# Patient Record
Sex: FEMALE | Race: WHITE | NOT HISPANIC OR LATINO | Employment: FULL TIME | URBAN - METROPOLITAN AREA
[De-identification: names, ages, dates, MRNs, and addresses within clinical notes are randomized per-mention and may not be internally consistent; named-entity substitution may affect disease eponyms.]

---

## 2017-04-25 ENCOUNTER — TRANSFERRED RECORDS (OUTPATIENT)
Dept: HEALTH INFORMATION MANAGEMENT | Facility: CLINIC | Age: 37
End: 2017-04-25

## 2017-04-26 ENCOUNTER — HOSPITAL ENCOUNTER (OUTPATIENT)
Facility: CLINIC | Age: 37
Setting detail: OBSERVATION
Discharge: HOME | End: 2017-04-28
Attending: EMERGENCY MEDICINE | Admitting: HOSPITALIST
Payer: COMMERCIAL

## 2017-04-26 ENCOUNTER — APPOINTMENT (OUTPATIENT)
Dept: CT IMAGING | Facility: CLINIC | Age: 37
End: 2017-04-26
Attending: EMERGENCY MEDICINE
Payer: COMMERCIAL

## 2017-04-26 DIAGNOSIS — Z98.890 STATUS POST COLONOSCOPY: ICD-10-CM

## 2017-04-26 DIAGNOSIS — K65.9 PERITONITIS (H): Primary | ICD-10-CM

## 2017-04-26 LAB
ALBUMIN SERPL-MCNC: 4.2 G/DL (ref 3.4–5)
ALBUMIN UR-MCNC: NEGATIVE MG/DL
ALP SERPL-CCNC: 61 U/L (ref 40–150)
ALT SERPL W P-5'-P-CCNC: 27 U/L (ref 0–50)
ANION GAP SERPL CALCULATED.3IONS-SCNC: 5 MMOL/L (ref 3–14)
APPEARANCE UR: CLEAR
AST SERPL W P-5'-P-CCNC: 18 U/L (ref 0–45)
BASOPHILS # BLD AUTO: 0 10E9/L (ref 0–0.2)
BASOPHILS NFR BLD AUTO: 0.2 %
BILIRUB SERPL-MCNC: 0.6 MG/DL (ref 0.2–1.3)
BILIRUB UR QL STRIP: NEGATIVE
BUN SERPL-MCNC: 7 MG/DL (ref 7–30)
CALCIUM SERPL-MCNC: 8.9 MG/DL (ref 8.5–10.1)
CHLORIDE SERPL-SCNC: 102 MMOL/L (ref 94–109)
CO2 SERPL-SCNC: 30 MMOL/L (ref 20–32)
COLOR UR AUTO: YELLOW
CREAT SERPL-MCNC: 0.71 MG/DL (ref 0.52–1.04)
DIFFERENTIAL METHOD BLD: ABNORMAL
EOSINOPHIL # BLD AUTO: 0.1 10E9/L (ref 0–0.7)
EOSINOPHIL NFR BLD AUTO: 0.7 %
ERYTHROCYTE [DISTWIDTH] IN BLOOD BY AUTOMATED COUNT: 12.7 % (ref 10–15)
GFR SERPL CREATININE-BSD FRML MDRD: ABNORMAL ML/MIN/1.7M2
GLUCOSE SERPL-MCNC: 111 MG/DL (ref 70–99)
GLUCOSE UR STRIP-MCNC: NEGATIVE MG/DL
HCG SERPL QL: NEGATIVE
HCT VFR BLD AUTO: 41.9 % (ref 35–47)
HGB BLD-MCNC: 14 G/DL (ref 11.7–15.7)
HGB UR QL STRIP: NEGATIVE
IMM GRANULOCYTES # BLD: 0 10E9/L (ref 0–0.4)
IMM GRANULOCYTES NFR BLD: 0.3 %
KETONES UR STRIP-MCNC: 10 MG/DL
LEUKOCYTE ESTERASE UR QL STRIP: NEGATIVE
LIPASE SERPL-CCNC: 127 U/L (ref 73–393)
LYMPHOCYTES # BLD AUTO: 1.3 10E9/L (ref 0.8–5.3)
LYMPHOCYTES NFR BLD AUTO: 10 %
MCH RBC QN AUTO: 32.4 PG (ref 26.5–33)
MCHC RBC AUTO-ENTMCNC: 33.4 G/DL (ref 31.5–36.5)
MCV RBC AUTO: 97 FL (ref 78–100)
MONOCYTES # BLD AUTO: 0.8 10E9/L (ref 0–1.3)
MONOCYTES NFR BLD AUTO: 5.8 %
NEUTROPHILS # BLD AUTO: 11.1 10E9/L (ref 1.6–8.3)
NEUTROPHILS NFR BLD AUTO: 83 %
NITRATE UR QL: NEGATIVE
NRBC # BLD AUTO: 0 10*3/UL
NRBC BLD AUTO-RTO: 0 /100
PH UR STRIP: 7.5 PH (ref 5–7)
PLATELET # BLD AUTO: 200 10E9/L (ref 150–450)
POTASSIUM SERPL-SCNC: 4.6 MMOL/L (ref 3.4–5.3)
PROT SERPL-MCNC: 7.6 G/DL (ref 6.8–8.8)
RBC # BLD AUTO: 4.32 10E12/L (ref 3.8–5.2)
RBC #/AREA URNS AUTO: <1 /HPF (ref 0–2)
SODIUM SERPL-SCNC: 137 MMOL/L (ref 133–144)
SP GR UR STRIP: 1.01 (ref 1–1.03)
SQUAMOUS #/AREA URNS AUTO: 1 /HPF (ref 0–1)
URN SPEC COLLECT METH UR: ABNORMAL
UROBILINOGEN UR STRIP-MCNC: NORMAL MG/DL (ref 0–2)
WBC # BLD AUTO: 13.3 10E9/L (ref 4–11)
WBC #/AREA URNS AUTO: 1 /HPF (ref 0–2)

## 2017-04-26 PROCEDURE — 99254 IP/OBS CNSLTJ NEW/EST MOD 60: CPT | Performed by: SURGERY

## 2017-04-26 PROCEDURE — 25000125 ZZHC RX 250: Performed by: EMERGENCY MEDICINE

## 2017-04-26 PROCEDURE — 25000128 H RX IP 250 OP 636: Performed by: PHYSICIAN ASSISTANT

## 2017-04-26 PROCEDURE — 80053 COMPREHEN METABOLIC PANEL: CPT | Performed by: EMERGENCY MEDICINE

## 2017-04-26 PROCEDURE — 96361 HYDRATE IV INFUSION ADD-ON: CPT

## 2017-04-26 PROCEDURE — G0378 HOSPITAL OBSERVATION PER HR: HCPCS

## 2017-04-26 PROCEDURE — 81001 URINALYSIS AUTO W/SCOPE: CPT | Performed by: PHYSICIAN ASSISTANT

## 2017-04-26 PROCEDURE — 83690 ASSAY OF LIPASE: CPT | Performed by: EMERGENCY MEDICINE

## 2017-04-26 PROCEDURE — 74177 CT ABD & PELVIS W/CONTRAST: CPT

## 2017-04-26 PROCEDURE — 99220 ZZC INITIAL OBSERVATION CARE,LEVL III: CPT | Performed by: PHYSICIAN ASSISTANT

## 2017-04-26 PROCEDURE — 25000128 H RX IP 250 OP 636: Performed by: EMERGENCY MEDICINE

## 2017-04-26 PROCEDURE — 25500064 ZZH RX 255 OP 636: Performed by: EMERGENCY MEDICINE

## 2017-04-26 PROCEDURE — 99285 EMERGENCY DEPT VISIT HI MDM: CPT | Mod: 25

## 2017-04-26 PROCEDURE — 84703 CHORIONIC GONADOTROPIN ASSAY: CPT | Performed by: EMERGENCY MEDICINE

## 2017-04-26 PROCEDURE — 96374 THER/PROPH/DIAG INJ IV PUSH: CPT | Mod: 59

## 2017-04-26 PROCEDURE — 85025 COMPLETE CBC W/AUTO DIFF WBC: CPT | Performed by: EMERGENCY MEDICINE

## 2017-04-26 RX ORDER — PROCHLORPERAZINE 25 MG
25 SUPPOSITORY, RECTAL RECTAL EVERY 12 HOURS PRN
Status: DISCONTINUED | OUTPATIENT
Start: 2017-04-26 | End: 2017-04-28 | Stop reason: HOSPADM

## 2017-04-26 RX ORDER — VILAZODONE HYDROCHLORIDE 20 MG/1
20 TABLET ORAL DAILY
Status: DISCONTINUED | OUTPATIENT
Start: 2017-04-27 | End: 2017-04-28 | Stop reason: HOSPADM

## 2017-04-26 RX ORDER — ACETAMINOPHEN 325 MG/1
650 TABLET ORAL EVERY 4 HOURS PRN
Status: DISCONTINUED | OUTPATIENT
Start: 2017-04-26 | End: 2017-04-28 | Stop reason: HOSPADM

## 2017-04-26 RX ORDER — PROCHLORPERAZINE MALEATE 5 MG
5-10 TABLET ORAL EVERY 6 HOURS PRN
Status: DISCONTINUED | OUTPATIENT
Start: 2017-04-26 | End: 2017-04-28 | Stop reason: HOSPADM

## 2017-04-26 RX ORDER — LIDOCAINE 40 MG/G
CREAM TOPICAL
Status: DISCONTINUED | OUTPATIENT
Start: 2017-04-26 | End: 2017-04-28 | Stop reason: HOSPADM

## 2017-04-26 RX ORDER — ACETAMINOPHEN 650 MG/1
650 SUPPOSITORY RECTAL EVERY 4 HOURS PRN
Status: DISCONTINUED | OUTPATIENT
Start: 2017-04-26 | End: 2017-04-28 | Stop reason: HOSPADM

## 2017-04-26 RX ORDER — PIPERACILLIN SODIUM, TAZOBACTAM SODIUM 3; .375 G/15ML; G/15ML
3.38 INJECTION, POWDER, LYOPHILIZED, FOR SOLUTION INTRAVENOUS EVERY 6 HOURS
Status: DISCONTINUED | OUTPATIENT
Start: 2017-04-26 | End: 2017-04-28 | Stop reason: HOSPADM

## 2017-04-26 RX ORDER — LAMOTRIGINE 25 MG/1
25 TABLET ORAL DAILY
Status: DISCONTINUED | OUTPATIENT
Start: 2017-04-27 | End: 2017-04-28 | Stop reason: HOSPADM

## 2017-04-26 RX ORDER — PIPERACILLIN SODIUM, TAZOBACTAM SODIUM 3; .375 G/15ML; G/15ML
3.38 INJECTION, POWDER, LYOPHILIZED, FOR SOLUTION INTRAVENOUS ONCE
Status: COMPLETED | OUTPATIENT
Start: 2017-04-26 | End: 2017-04-26

## 2017-04-26 RX ORDER — PRENATAL VIT/IRON FUM/FOLIC AC 27MG-0.8MG
1 TABLET ORAL DAILY
COMMUNITY

## 2017-04-26 RX ORDER — HYDROMORPHONE HYDROCHLORIDE 1 MG/ML
.3-.5 INJECTION, SOLUTION INTRAMUSCULAR; INTRAVENOUS; SUBCUTANEOUS
Status: DISCONTINUED | OUTPATIENT
Start: 2017-04-26 | End: 2017-04-28 | Stop reason: HOSPADM

## 2017-04-26 RX ORDER — OXYCODONE HYDROCHLORIDE 5 MG/1
5-10 TABLET ORAL
Status: DISCONTINUED | OUTPATIENT
Start: 2017-04-26 | End: 2017-04-28 | Stop reason: HOSPADM

## 2017-04-26 RX ORDER — AMOXICILLIN 250 MG
1-2 CAPSULE ORAL 2 TIMES DAILY PRN
Status: DISCONTINUED | OUTPATIENT
Start: 2017-04-26 | End: 2017-04-28 | Stop reason: HOSPADM

## 2017-04-26 RX ORDER — ONDANSETRON 4 MG/1
4 TABLET, ORALLY DISINTEGRATING ORAL EVERY 6 HOURS PRN
Status: DISCONTINUED | OUTPATIENT
Start: 2017-04-26 | End: 2017-04-28 | Stop reason: HOSPADM

## 2017-04-26 RX ORDER — ONDANSETRON 2 MG/ML
4 INJECTION INTRAMUSCULAR; INTRAVENOUS
Status: DISCONTINUED | OUTPATIENT
Start: 2017-04-26 | End: 2017-04-26

## 2017-04-26 RX ORDER — NALOXONE HYDROCHLORIDE 0.4 MG/ML
.1-.4 INJECTION, SOLUTION INTRAMUSCULAR; INTRAVENOUS; SUBCUTANEOUS
Status: DISCONTINUED | OUTPATIENT
Start: 2017-04-26 | End: 2017-04-28 | Stop reason: HOSPADM

## 2017-04-26 RX ORDER — SODIUM CHLORIDE 9 MG/ML
INJECTION, SOLUTION INTRAVENOUS CONTINUOUS
Status: DISCONTINUED | OUTPATIENT
Start: 2017-04-26 | End: 2017-04-28 | Stop reason: HOSPADM

## 2017-04-26 RX ORDER — IOPAMIDOL 755 MG/ML
53 INJECTION, SOLUTION INTRAVASCULAR ONCE
Status: COMPLETED | OUTPATIENT
Start: 2017-04-26 | End: 2017-04-26

## 2017-04-26 RX ORDER — ONDANSETRON 2 MG/ML
4 INJECTION INTRAMUSCULAR; INTRAVENOUS EVERY 6 HOURS PRN
Status: DISCONTINUED | OUTPATIENT
Start: 2017-04-26 | End: 2017-04-28 | Stop reason: HOSPADM

## 2017-04-26 RX ADMIN — SODIUM CHLORIDE 1000 ML: 9 INJECTION, SOLUTION INTRAVENOUS at 11:26

## 2017-04-26 RX ADMIN — IOPAMIDOL 53 ML: 755 INJECTION, SOLUTION INTRAVENOUS at 10:35

## 2017-04-26 RX ADMIN — PIPERACILLIN SODIUM,TAZOBACTAM SODIUM 3.38 G: 3; .375 INJECTION, POWDER, FOR SOLUTION INTRAVENOUS at 20:11

## 2017-04-26 RX ADMIN — SODIUM CHLORIDE 125 ML/HR: 9 INJECTION, SOLUTION INTRAVENOUS at 14:13

## 2017-04-26 RX ADMIN — SODIUM CHLORIDE 60 ML: 9 INJECTION, SOLUTION INTRAVENOUS at 10:35

## 2017-04-26 RX ADMIN — PIPERACILLIN SODIUM,TAZOBACTAM SODIUM 3.38 G: 3; .375 INJECTION, POWDER, FOR SOLUTION INTRAVENOUS at 13:25

## 2017-04-26 ASSESSMENT — ENCOUNTER SYMPTOMS
BACK PAIN: 0
DIFFICULTY URINATING: 0
ABDOMINAL PAIN: 1
FLANK PAIN: 0
DYSURIA: 0
VOMITING: 0
FREQUENCY: 0
FEVER: 0
NAUSEA: 1
HEMATURIA: 0
CHILLS: 0
DIARRHEA: 0

## 2017-04-26 NOTE — CONSULTS
General Surgery Consultation    Stephanie Corbin MRN# [id number]   Age: 36 year old YOB: 1980     Date of Admission:  4/26/2017    Reason for consult: Colitis following colonoscopy with polypectomy       Requesting physician: Jl                Assessment and Plan:   Assessment:   Acute colitis of the cecum following polypectomy      Plan:   N.p.o.  IV antibiotics  Will follow along             Chief Complaint:   Abdominal pain     History is obtained from the patient         History of Present Illness:   This patient is a 36 year old  female without a significant past medical history who presents with Abdominal pain.  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 A sessile polyp was removed from the cecum. Last night she abruptly developed lower abdominal pain in the lower abdomen, this has seemed less today but persistent. She presented to the ed where a CT was obtained, she has no free air but there is noted thickening of the cecum and some free fluid. She was admitted and administered IV abx. She denies nausea or vomiting, she has had no fevers or chills.          Past Medical History:    has a past medical history of Anxiety.          Past Surgical History:     Past Surgical History:   Procedure Laterality Date     ENT SURGERY             Medications:     No current facility-administered medications on file prior to encounter.   No current outpatient prescriptions on file prior to encounter.      Allergies:      Allergies   Allergen Reactions     Reglan [Metoclopramide]             Social History:             Family History:   The patient has no family history of any bleeding, clotting or anesthesia problems.          Review of Systems:   Ten point Review of Systems is negative other than noted in the HPI          Physical Exam:   Gen:  This is a well developed, well nourished woman in no apparent distress.  Blood pressure 104/44,  "pulse 70, temperature 99  F (37.2  C), temperature source Oral, resp. rate 16, height 1.702 m (5' 7.01\"), weight 46.2 kg (101 lb 12.8 oz), SpO2 95 %.  HEENT - Normocephalic, atraumatic, mucous membranes moist.  no scleral icterus.  Neck - supple without masses  Lungs - clear to ascultation.    Heart - Regular rate & rhythm without murmur  Abdomen:   soft, non-distended, tenderness noted in the right lower quadrant and in the left lower quadrant and no masses palpated, hypoactive bowel sounds, mild tenderness to percussion   Extremities - warm without edema  Neurologic - nonfocal          Data:   WBC -   WBC   Date Value Ref Range Status   04/26/2017 13.3 (H) 4.0 - 11.0 10e9/L Final   ], HgB - Hemoglobin   Date Value Ref Range Status   04/26/2017 14.0 11.7 - 15.7 g/dL Final   ]   Liver Function Studies -   Recent Labs   Lab Test  04/26/17   0919   PROTTOTAL  7.6   ALBUMIN  4.2   BILITOTAL  0.6   ALKPHOS  61   AST  18   ALT  27     CT scan of the abdomen:   Personally reviewed   Ultrasound of the abdomen:     William Dumont MD       "

## 2017-04-26 NOTE — PLAN OF CARE
Problem: Goal Outcome Summary  Goal: Goal Outcome Summary  Outcome: No Change  RN:  Patient A/O x4.  Heart rate parveen, other vital signs stable.  No requests for pain medication but states she does have abdominal pain.  IV fluids and IV abx infusing as ordered.  Tolerating diet at this time.  No emesis.  GI and surgery following.  Discharge pending progress.

## 2017-04-26 NOTE — CONSULTS
"[facility]  Gastroenterology Consultation    Stephanie Corbin  [id number] [facility] [address]  36 year old female    Admission Date/Time: 4/26/2017  Primary Care Provider: [facility]    We were asked to see the patient in consultation by HARDEEP France for evaluation of abdominal pain.        HPI:  Stephanie Corbin is a 36 year old female who has a PMH significant for santana syndrome and anxiety who presents to [facility] for evaluation of abdominal pain.    Per the patient, she was in her usual state of health and had a colonoscopy yesterday.  She had a flat lesion removed from the cecum with a hot snare.  About 2-3 hours after this, she developed lower quadrant abdominal pain.  This persisted and was very sharp.  She denies any fever, chills, nausea, vomiting, CP, SOB, diarrhea, hematochezia or melena.      On admission, WBC was mildly increased at 13.3.  Labs were otherwise unremarkable.   CT scan revealed inflammatory wall thickening of the cecum and distal ileum distended with fluid.  The appendix could not be visualized.  No perforation was noted.      ROS: A comprehensive ten point review of systems was negative aside from those in mentioned in the HPI.      MEDICATIONS:   No current facility-administered medications on file prior to encounter.   No current outpatient prescriptions on file prior to encounter.    ALLERGIES:   Allergies   Allergen Reactions     Reglan [Metoclopramide]        Past Medical History:   Diagnosis Date     Anxiety        Past Surgical History:   Procedure Laterality Date     ENT SURGERY           SOCIAL HISTORY:  Social History   Substance Use Topics     Smoking status: Never Smoker     Smokeless tobacco: Not on file     Alcohol use Not on file       FAMILY HISTORY:  Santana syndrome    PHYSICAL EXAM:   /44 (BP Location: Left arm)  Pulse 70  Temp 99  F (37.2  C) (Oral)  Resp 16  Ht 1.702 m (5' 7.01\")  Wt 46.2 kg (101 lb " 12.8 oz)  SpO2 95%  BMI 15.94 kg/m2    Constitutional: NAD, comfortable  Cardiovascular: RRR, normal S1 and S2, no r/c/g/m  Respiratory: CTAB  Psychiatric: mentation appears normal and affect normal  Head: Normocephalic. Atraumatic.    Neck: Neck supple. No adenopathy. Thyroid symmetric, normal size, trachea midline  Eyes:  PERRL, no icterus  ENT: Hearing adequate, pharynx normal without erythema or exudate  Abdomen:   Auscultation: +BS  Appearance: normal  Palpation: soft, TTP with guarding in the suprapelvic region but no rebound.  Non-distended  NEURO: grossly negative  SKIN: no suspicious lesions or rashes  LYMPH:   anterior cervical: no adenopathy  posterior cervical: no adenopathy  supraclavicular: no adenopathy          ADDITIONAL COMMENTS:   I reviewed the patient's new clinical lab test results.   Recent Labs   Lab Test  04/26/17   0919   WBC  13.3*   HGB  14.0   MCV  97   PLT  200     Recent Labs   Lab Test  04/26/17   0919   NA  137   POTASSIUM  4.6   CHLORIDE  102   CO2  30   BUN  7   CR  0.71   ANIONGAP  5   MOOKIE  8.9   GLC  111*     Recent Labs   Lab Test  04/26/17   0919   ALBUMIN  4.2   BILITOTAL  0.6   ALT  27   AST  18   ALKPHOS  61   LIPASE  127             .    CONSULTATION ASSESSMENT AND PLAN:    Active Problems:  Serosal burn, cecum    Assessment: 36 year old female with likely serosal burn secondary to removal of a flat lesion in the cecum on 4/25 with a hot snare.  No peritoneal signs or symptoms/signs of perforation.      Plan:   -Full liquids OK  -Hold on CRS consult for now  -Continue IVF, IV antibiotics  -Patient will likely improve and be able to be discharged tomorrow but monitor course        I will discuss the patient's findings and plan with Dr. Parikh who will independently examine the patient and add further recommendations as necessary.          HARDEEP Ledesma  [facility]  Office:  [phone] call if needed after 5PM  Cell:  [phone], not available  after 5PM at this number      GI Staff:  As above.  CT does demonstrate some fluid in the RLQ.  I would like to have surgery see but likely will improve with just observation and IV antibiotics.  Manfred Parikh MD  [phone]  After 5 pm or on weekends  [phone]

## 2017-04-26 NOTE — PROGRESS NOTES
Per surgeon, patient will be made NPO except for ice chips.  Patient with abdominal tenderness.  Will continue on IV fluids and antibiotics.

## 2017-04-26 NOTE — ED NOTES
"[facility]  ED Nurse Handoff Report    ED Chief complaint: Abdominal Pain (pt had colonoscopy at [facility] in [place] yesterday, diffuse abd pain started last night, worse today. pt denies bleeding. had polyps removed per pt.)      ED Diagnosis:   Final diagnoses:   Peritonitis (H)   Status post colonoscopy       Code Status: Full Code    Allergies:   Allergies   Allergen Reactions     Reglan [Metoclopramide]        Activity level - Baseline/Home:  Independent    Activity Level - Current:   Independent     Needed?: No    Isolation: No  Infection: Not Applicable    Bariatric?: No    Vital Signs:   Vitals:    04/26/17 0907 04/26/17 1100 04/26/17 1130 04/26/17 1200   BP: 109/68 96/54 100/59 101/55   Pulse: 70      Resp: 14 16 18 16   Temp: 98.4  F (36.9  C)      TempSrc: Oral      SpO2: 100% 100% 93% 100%   Weight: 47.6 kg (105 lb)      Height: 1.702 m (5' 7\")          Cardiac Rhythm: ,        Pain level: 0-10 Pain Scale: 3    Is this patient confused?: No    Patient Report: Initial Complaint: pt had colonoscopy yesterday had polyps removed and started with generalized abd pain last night  Focused Assessment: abd pain with nausea denies emisis  Tests Performed: labs, ct of abd  Abnormal Results: ct showed peritonitis   Treatments provided: pt npo fluids given pt refused zofran    Family Comments: none here    OBS brochure/video discussed/provided to patient: N/A    ED Medications:   Medications   ondansetron (ZOFRAN) injection 4 mg (not administered)   sodium chloride 0.9 % for CT scan flush dose 60 mL (60 mLs Intravenous Given 4/26/17 1035)   iopamidol (ISOVUE-370) solution 53 mL (53 mLs Intravenous Given 4/26/17 1035)   0.9% sodium chloride BOLUS (1,000 mLs Intravenous New Bag 4/26/17 1126)       Drips infusing?:  Yes      ED NURSE PHONE NUMBER: [phone]       "

## 2017-04-26 NOTE — H&P
PRIMARY CARE PHYSICIAN:  [facility].      DATE OF SERVICE:  04/26/2017.       CHIEF COMPLAINT:  Abdominal pain.      HISTORY OF PRESENT ILLNESS:  Stephanie Corbin is a very pleasant 36-year-old female with a past medical history of Cabrales syndrome and anxiety who presented to the Emergency Department due to abdominal pain.  The patient underwent a colonoscopy yesterday with [facility].  This was for evaluation, given her Cabrales syndrome.  The patient reports they found a polyp in the cecum and this was removed.  There were no immediate complications with the procedure.  She felt fine afterwards and returned home.  Later in the evening, she developed lower abdominal pain and bloating.  She reports that she had shooting pains throughout her lower abdomen that persisted into this morning.  Her pain is severe and is now described as more of a dull ache.  She denies any nausea or vomiting.  She has had no bowel movements since the procedure.  She states she has felt chilled overnight but no fever or sweats.  The patient called [facility] this morning and they recommended she go to the Emergency Department for evaluation.      In the Emergency Department, patient was evaluated by Dr. Villa.  There she was found to have initial vital signs of blood pressure 109/68, pulse 70 and temperature 98.4.  She had lab work that showed WBC of 13.3, but CBC was otherwise within normal limits.  Her CMP was unremarkable.  Lipase was normal.  HCG is negative.  She had a CT of the abdomen and pelvis with contrast that showed some inflammatory wall thickening of the cecum and distal ileum that appears distended with fluid.  This is suggestive for infectious or inflammatory ileitis and cecal colitis.  There was no abscess or free air.  There was some small volume of free fluid in the deep pelvis with adjacent enhancement that could represent peritonitis, per Radiology.  She was given 1 L IV  fluid bolus and started on IV Zosyn.  The case was discussed with Dr. Parikh, who is on-call for [facility].  Dr. Villa also did communicate with Dr. Dumont of General Surgery.  The plan is to admit the patient to observation care on IV fluids and antibiotics for further monitoring.      PAST MEDICAL HISTORY:   1.  Cabrales syndrome.   2.  Anxiety.      PAST SURGICAL HISTORY:   1.  ENT surgery.   2.  Colonoscopy on 04/25/2017.      FAMILY HISTORY:  Positive for Cabrales syndrome in her mother.  She otherwise denies any family history of heart attack, stroke or diabetes.      SOCIAL HISTORY:  The patient is a lifelong nonsmoker.  She does not drink alcohol.  She is an .  Her  is present with her in the Emergency Department.      PRIOR TO ADMISSION MEDICATIONS:   1.  Lamictal 25 mg daily.   2.  Prenatal multivitamin 1 tablet daily.    3.  Probiotic 1 tablet daily.   4.  Zoloft 100 mg daily.   5.  Viibryd 20 mg daily.      ALLERGIES:  Reglan, seizures.      REVIEW OF SYSTEMS:  The patient denies any fevers, headache, lightheadedness, chest pain, shortness of breath, nausea, vomiting, diarrhea, dysuria, focal weakness, numbness or tingling.  Complete 10-point review of systems was performed and is negative other than the items previously mentioned above in HPI.      PHYSICAL EXAMINATION:   VITAL SIGNS:  Blood pressure 104/44, heart rate 50 beats per minute, temperature 99, respiratory rate 16, oxygen saturation 95% on room air.   GENERAL:  The patient is alert, oriented to person, place and situation, cooperative, lying in bed in no apparent distress.   EYES:  Pupils equal and round.  Extraocular movements intact.  Sclerae are nonicteric.   HEENT:  Head normocephalic.  Throat, lips, mucosa and tongue appear moist.   NECK:  Supple.   CARDIOVASCULAR:  Heart bradycardic and regular.  No murmurs, rubs or gallops.  Distal pulses are intact.  No edema.   PULMONARY:  Lungs are clear to  auscultation bilaterally.  No crackles, wheezes or rhonchi.  Breathing is unlabored.   GASTROINTESTINAL:  Soft, diffusely tender to light palpation, greatest in bilateral lower quadrants.  There is some moderate guarding.  No rebound.  Bowel sounds are normoactive.   MUSCULOSKELETAL:  The patient moves all 4 extremities equally with normal strength.   NEUROLOGIC:  Alert.  Cranial nerves II-XII are grossly intact.  Motor function is grossly intact.  No focal deficits.   SKIN:  Cool, dry, nondiaphoretic.  No rashes seen on limited exam.   PSYCHIATRIC:  Normal mood and affect.      LABORATORY DATA:  CBC:  WBC 13.3, hemoglobin 14.0, platelet count 200, otherwise within normal limits.  CMP all within normal limits, glucose 111, creatinine 0.71, potassium 4.6.  Her hCG is negative.  Lipase 127.  LFTs are all within normal limits.      IMAGING:  CT abdomen and pelvis with contrast.  As per Radiology, there is some inflammatory wall thickening of the cecum and distal ileum that appears distended.  This is suggestive for an infectious or inflammatory ileitis and cecal colitis.  Please note that the appendix cannot be adequately visualized for assessment.  If there is concern for appendicitis, repeat imaging may be necessary.  There is no abscess or free air.  There is some small volume of free fluid in the deep pelvis with some adjacent enhancement that could represent peritonitis.      ASSESSMENT AND PLAN:  Lorena Corbin is a very pleasant 36-year-old female with a past medical history of Cabrales syndrome and anxiety who presented to the Emergency Department today with abdominal pain that began after her colonoscopy she had yesterday.  She is being registered to [facility] care for further monitoring and treatment.     1.  Abdominal pain following colonoscopy.  The case was discussed with GI and this is likely due to serosal burn after a cecal polyp was removed yesterday.  She has had moderate to severe bilateral lower  abdominal pain starting last night and persisting into today with no associated nausea, vomiting, diarrhea or fevers.  Here, her vital signs are stable.  Her white blood cell count is elevated at 13.3.  Her CT abdomen findings are as above and did not show any evidence of perforation, but there is some free fluid in the deep pelvis with some adjacent enhancement that could represent peritonitis.  She does have a tender abdomen but does not appear to have peritonitis based on exam at this time.  We will treat with IV Zosyn.  Will hydrate with IV fluids.  She will be n.p.o.  I have ordered Gastroenterology and Colorectal Surgery consults.  She will be n.p.o. for now.  Monitor closely for any signs of worsening infection.  Will obtain a repeat complete blood count in the morning.     2.  Anxiety.  This issue seems to be stable at this time.  Prior to admission, she is on Lamictal, sertraline and Viibryd, which she will continue.     3.  Cabrales syndrome.  This is as noted above and will be managed per [facility].  The pathology from her colon polyp removal is pending.     4.  Deep venous thrombosis prophylaxis.  This will be mechanical with PCDs and ambulation.      CODE STATUS:  Full code.      DISPOSITION:  The patient is observation status and will likely be able to discharge in the next 24-48 hours pending clinical stability and improvement of her symptoms.  If the patient develops any worsening signs of infection/peritonitis, would transfer to inpatient status for further cares.      This patient was discussed with Dr. Zeinab Valerio of the [facility].  He is in agreement with my assessment and plan of care.         ZEINAB VALERIO MD       As dictated by SUKHWINDER ESPOSITO PA-C            D: 2017 14:21   T: 2017 15:26   MT: MARY      Name:     KRISTIN HAMILTON   MRN:      [id number]        Account:      [id number]   :      1980           Admitted:      [id number]      Document: [id number]       cc: [facility]

## 2017-04-26 NOTE — IP AVS SNAPSHOT
MRN:[id number]                      After Visit Summary   4/26/2017    Stephanie Corbin    MRN: [id number]           Thank you!     Thank you for choosing [facility] for your care. Our goal is always to provide you with excellent care. Hearing back from our patients is one way we can continue to improve our services. Please take a few minutes to complete the written survey that you may receive in the mail after you visit with us. Thank you!        Patient Information     Date Of Birth          1980        Designated Caregiver       Most Recent Value    Caregiver    Will someone help with your care after discharge? yes    Name of designated caregiver Kai Corbin    Phone number of caregiver [phone]    Caregiver address [address]      About your hospital stay     You were admitted on:  April 26, 2017 [facility]    You were discharged on:  April 28, 2017        Reason for your hospital stay       You were hospitalized for further evaluation and treatment of abdominal pain.                  Who to Call     For medical emergencies, please call 911.  For non-urgent questions about your medical care, please call your primary care provider or clinic, [phone]          Attending Provider     Provider Specialty    Ye Villa MD Emergency Medicine    Raheem Mclean MD Internal Medicine       Primary Care Provider Office Phone # Fax #    [facility] [phone] [address] [facility] [address]        After Care Instructions     Activity       Your activity upon discharge: activity as tolerated            Diet       Follow this diet upon discharge: Advance diet as tolerated            Discharge Instructions       1) Follow-up with your primary care provider in the next week to discuss hospitalization   2) Continue prior to admission medications   3) Cipro and Flagyl  "(antibiotics) prescribed at discharge; take as instructed   4) Advance diet as tolerated                  Follow-up Appointments     Follow-up and recommended labs and tests        Follow up with primary care provider, [facility], within 7 days for hospital follow- up.  No follow up labs or test are needed.                  Pending Results     No orders found from 2017 to 2017.            Statement of Approval     Ordered          17 0927  I have reviewed and agree with all the recommendations and orders detailed in this document.  EFFECTIVE NOW     Approved and electronically signed by:  Rosey Stahl PA-C             Admission Information     Date & Time Provider Department Dept. Phone    2017 Raheem Mclean MD [facility] [phone]      Your Vitals Were     Blood Pressure Pulse Temperature Respirations Height Weight    101/41 70 97.1  F (36.2  C) 16 1.702 m (5' 7.01\") 46.2 kg (101 lb 12.8 oz)    Pulse Oximetry BMI (Body Mass Index)                99% 15.94 kg/m2          [employer] Information     [employer] lets you send messages to your doctor, view your test results, renew your prescriptions, schedule appointments and more. To sign up, go to [email or web address] . Click on \"Log in\" on the left side of the screen, which will take you to the Welcome page. Then click on \"Sign up Now\" on the right side of the page.     You will be asked to enter the access code listed below, as well as some personal information. Please follow the directions to create your username and password.     Your access code is: [email or web address]  Expires: 2017  9:48 AM     Your access code will  in 90 days. If you need help or a new code, please call your [facility] clinic or [phone].        Care EveryWhere ID     This is your Care EveryWhere ID. This could be used by other organizations to access your [facility] medical records  [id number]           Review " of your medicines      START taking        Dose / Directions    ciprofloxacin 500 MG tablet   Commonly known as:  CIPRO        Dose:  500 mg   Take 1 tablet (500 mg) by mouth 2 times daily   Quantity:  14 tablet   Refills:  0       metroNIDAZOLE 500 MG tablet   Commonly known as:  FLAGYL        Dose:  500 mg   Take 1 tablet (500 mg) by mouth 3 times daily for 7 days   Quantity:  21 tablet   Refills:  0         CONTINUE these medicines which have NOT CHANGED        Dose / Directions    LAMOTRIGINE PO        Dose:  25 mg   Take 25 mg by mouth daily Starting 4/29 to increase to 50mg daily   Refills:  0       prenatal multivitamin  plus iron 27-0.8 MG Tabs per tablet        Dose:  1 tablet   Take 1 tablet by mouth daily   Refills:  0       PROBIOTIC DAILY PO        Dose:  1 tablet   Take 1 tablet by mouth daily   Refills:  0       SERTRALINE HCL PO        Dose:  100 mg   Take 100 mg by mouth daily Weaning off after 2 more doses   Refills:  0       VIIBRYD PO        Dose:  20 mg   Take 20 mg by mouth daily Suppose to increase to 40mg daily on 4/29/17   Refills:  0            Where to get your medicines      These medications were sent to [facility] - [address]  [address] [facility] [address], [place] [address]     Phone:  [phone]     ciprofloxacin 500 MG tablet    metroNIDAZOLE 500 MG tablet                Protect others around you: Learn how to safely use, store and throw away your medicines at www.disposemymeds.org.             Medication List: This is a list of all your medications and when to take them. Check marks below indicate your daily home schedule. Keep this list as a reference.      Medications           Morning Afternoon Evening Bedtime As Needed    ciprofloxacin 500 MG tablet   Commonly known as:  CIPRO   Take 1 tablet (500 mg) by mouth 2 times daily                                LAMOTRIGINE PO   Take 25 mg by mouth daily Starting 4/29 to increase to 50mg daily   Last  time this was given:  25 mg on 4/28/2017  8:15 AM                                metroNIDAZOLE 500 MG tablet   Commonly known as:  FLAGYL   Take 1 tablet (500 mg) by mouth 3 times daily for 7 days                                prenatal multivitamin  plus iron 27-0.8 MG Tabs per tablet   Take 1 tablet by mouth daily                                PROBIOTIC DAILY PO   Take 1 tablet by mouth daily                                SERTRALINE HCL PO   Take 100 mg by mouth daily Weaning off after 2 more doses   Last time this was given:  100 mg on 4/28/2017  8:15 AM                                VIIBRYD PO   Take 20 mg by mouth daily Suppose to increase to 40mg daily on 4/29/17   Last time this was given:  20 mg on 4/28/2017  8:15 AM

## 2017-04-26 NOTE — PHARMACY-ADMISSION MEDICATION HISTORY
Admission medication history interview status for the 4/26/2017  admission is complete. See EPIC admission navigator for prior to admission medications     Medication history source reliability:Good    Actions taken by pharmacist (provider contacted, etc):None     Additional medication history information not noted on PTA med list :None    Medication reconciliation/reorder completed by provider prior to medication history? No    Time spent in this activity: 15 min    Prior to Admission medications    Medication Sig Last Dose Taking? Auth Provider   LAMOTRIGINE PO Take 25 mg by mouth daily Starting 4/29 to increase to 50mg daily 4/26/2017 at Unknown time Yes Unknown, Entered By History   Vilazodone HCl (VIIBRYD PO) Take 20 mg by mouth daily Suppose to increase to 40mg daily on 4/29/17 4/26/2017 at Unknown time Yes Unknown, Entered By History   SERTRALINE HCL PO Take 100 mg by mouth daily Weaning off after 2 more doses 4/26/2017 at Unknown time Yes Unknown, Entered By History   Prenatal Vit-Fe Fumarate-FA (PRENATAL MULTIVITAMIN  PLUS IRON) 27-0.8 MG TABS per tablet Take 1 tablet by mouth daily 4/25/2017 at Unknown time Yes Unknown, Entered By History   Probiotic Product (PROBIOTIC DAILY PO) Take 1 tablet by mouth daily 4/26/2017 at Unknown time Yes Unknown, Entered By History

## 2017-04-26 NOTE — ED PROVIDER NOTES
"  History     Chief Complaint:  Abdominal Pain     HPI   Stephanie Corbin is a 36 year old female approximately 18 hours s/p colonoscopy who presents with abdominal pain. The patient had a screening colonoscopy (early for Cabrales syndrome) yesterday afternoon by Dr. Wei at [facility]. No complications with the procedure. Patient reports they found a polyp near her appendix (findings below) and this was removed. The patient was doing well after the procedure but reports that she develop lower abdominal pain and bloating last night. She reports that she had shooting pains throughout her abdomen last night that persisted this morning. The patient called [facility] this morning and they recommended she go to the ED for evaluation. On arrival to the ED, the patient reports that she has shooting pain diffusely throughout her abdomen but mostly in the lower abdomen. She states the pain worsens when she walks or moves. The patient has not had a bowel movement since the colonoscopy. She notes nausea but denies any vomiting, back pain, flank pain, or urinary problems. Patient was able to eat a little bit for breakfast. No previous abdominal surgeries.     Findings from yesterday's colonscopy:   \"Polyp location: cecum. Quantity: 1. Size: 10 mm. Polyp shape: Flat lesion.   Maneuver: Saline injection and polypectomy hot snare.   Removal: Complete. Retrieval: complete. Bleeding: none  This is near the appendiceal orifice.   Comments: the patient has a tortuous colon. The procedure went well with a pediatric colonoscope. The scope would not retroflex with ease in the rectum, thus this was not pursued further. Thorough examination was achieved on slow withdrawal. \"      Allergies:  Reglan      Medications:    The patient is not currently taking any prescribed medications.    Past Medical History:    Cabrales syndrome  Anxiety    Past Surgical History:    ENT surgery  Colonoscopy - 4/25/17    Family History:    Cabrales syndrome " "    Social History:  Smoking status: No  Alcohol use: No  Patient is an      Review of Systems   Constitutional: Negative for chills and fever.   Gastrointestinal: Positive for abdominal pain and nausea. Negative for diarrhea and vomiting.   Genitourinary: Negative for difficulty urinating, dysuria, flank pain, frequency and hematuria.   Musculoskeletal: Negative for back pain.   All other systems reviewed and are negative.      Physical Exam     Patient Vitals for the past 24 hrs:   BP Temp Temp src Pulse Heart Rate Resp SpO2 Height Weight   04/26/17 1200 101/55 - - - 50 16 100 % - -   04/26/17 1130 100/59 - - - 48 18 93 % - -   04/26/17 1100 96/54 - - - 55 16 100 % - -   04/26/17 0907 109/68 98.4  F (36.9  C) Oral 70 - 14 100 % 1.702 m (5' 7\") 47.6 kg (105 lb)       Physical Exam  General: nontoxic appearing woman semi-recumbent after walking into room 2  HENT: mucous membranes slightly dry  CV: regular rate  Resp: clear throughout, normal effort, no crackles or wheezing  GI: abdomen soft and scaphoid but tender especially in lower quadrants with voluntary guarding, no distension, bowel sounds present, no discrete masses palpable  MSK: no bony tenderness, no CVAT  Skin: appropriately warm and dry  Neuro: alert, clear speech, oriented   Psych: normal mood and affect      Emergency Department Course   Imaging:  Radiographic findings were communicated with the patient who voiced understanding of the findings.    CT-scan Abdomen/Pelvis w contrast:  1. Some inflammatory wall thickening of the cecum and distal ileum  that appears distended with fluid. This is suggestive for an  infectious or inflammatory ileitis and cecal colitis. Please note that  the appendix cannot be adequately visualized for assessment. If there  is concern for appendicitis, repeat imaging may be necessary.  2. There is no abscess or free air.  3. Some small volume free fluid in the deep pelvis with some adjacent  enhancement could " represent a peritonitis.  Preliminary result per radiology.     Laboratory:  CBC: WBC 13.3(H), o/w  WNL (HGB 14.0, )   CMP: Glucose 111(H), o/w WNL (Creatinine 0.71)  Lipase: 127  HCG qual: Negative    Interventions:  1126: NS 1L IV Bolus  1325: Zosyn 3.375 g IV  She declined any analgesics.    Emergency Department Course:  Past medical records, nursing notes, and vitals reviewed.  0919: I performed an exam of the patient and obtained history, as documented above.  IV inserted and blood drawn.  The patient was sent for a CT abdomen pelvis while in the emergency department, findings above.    1135: I rechecked the patient. Explained findings to the patient.    1206: I spoke to Dr. Parikh on for [facility] who recommended IV antibiotics and admission for observation.   1210: I rechecked the patient. Explained findings to the patient.  Repeat abdominal exam still with significant bilateral lower quadrant tenderness.    1234: I spoke to Dr. Dumont of general surgery.     1248: I spoke to Donis Courtney on behalf of Dr. Mclean of the hospitalist service who accepts the patient for admission.     1252: I rechecked the patient. Explained findings to the patient. Patient agrees with plan for admission.    Findings and plan explained to the Patient who consents to admission.   Discussed the patient with Donis Courtney on behalf of Dr. Mclean, who will admit the patient to an obs bed for further monitoring, evaluation, and treatment.     Impression & Plan      Medical Decision Making:  This pleasant 36 year old presents with significant lower abdominal tenderness one day after a colonoscopy. CT was performed to evaluate for evidence of perforation or other complication. She does have nonspecific fluid in her pelvis as well as inflammation along her GI tract. I spoke with Dr. Parikh with GI who recommended initiation of IV antibiotics and hospitalization for serial exams and close monitoring due to concern for possible  perforation not seen on CT. I explained these findings and the rationale to the patient.  I spoke with Dr. Dumont with surgery who agreed with this plan. He suggests that colon and rectal surgery service consultation may be more appropriate though he is happy to be formally involved if requested. The patient's vitals are normal at this time and she is declining my offer of analgesia.     Diagnosis:    ICD-10-CM   1. Peritonitis (H) K65.9   2. Status post colonoscopy Z98.890       Disposition: Admitted to observation under the care of Dr. Vinay Salcido  4/26/2017    EMERGENCY DEPARTMENT    IAparna, am serving as a scribe at 9:19 AM on 4/26/2017 to document services personally performed by Ye Villa MD based on my observations and the provider's statements to me.        Ye Villa MD  04/26/17 9379

## 2017-04-26 NOTE — PROGRESS NOTES
Goals to meet prior to discharge home:   1 -Diagnostic tests completed. Not met.  No orders for further tests since admission to observation.   2- Colorectal surgery and GI consults completed:  Partially met.  GI consult completed. See notes. Colorectal consult discontinued.  Surgical consult placed.   3- Vital signs normal or at patient baseline: Goal met.  Heart rate parveen-baseline per view.   4 - Tolerating oral intake to maintain hydration:  Goal met at this time.  Patient to start on full liquid diet.  No emesis.   5- Adequate pain control on oral analgesics.  Goal met at this time.  Complaints of pain.  Requesting no pain meds at this time.   6- Infection is improving.  Not met.  Patient on scheduled IV antibiotics.

## 2017-04-26 NOTE — IP AVS SNAPSHOT
[facility]    [address] [place] [address]    Phone:  [phone]                                       After Visit Summary   4/26/2017    Stephanie Corbin    MRN: [id number]           After Visit Summary Signature Page     I have received my discharge instructions, and my questions have been answered. I have discussed any challenges I see with this plan with the nurse or doctor.    ..........................................................................................................................................  Patient/Patient Representative Signature      ..........................................................................................................................................  Patient Representative Print Name and Relationship to Patient    ..................................................               ................................................  Date                                            Time    ..........................................................................................................................................  Reviewed by Signature/Title    ...................................................              ..............................................  Date                                                            Time

## 2017-04-27 LAB
ALBUMIN UR-MCNC: NEGATIVE MG/DL
ANION GAP SERPL CALCULATED.3IONS-SCNC: 7 MMOL/L (ref 3–14)
APPEARANCE UR: CLEAR
BASOPHILS # BLD AUTO: 0 10E9/L (ref 0–0.2)
BASOPHILS NFR BLD AUTO: 0.4 %
BILIRUB UR QL STRIP: NEGATIVE
BUN SERPL-MCNC: 7 MG/DL (ref 7–30)
CALCIUM SERPL-MCNC: 8.1 MG/DL (ref 8.5–10.1)
CHLORIDE SERPL-SCNC: 112 MMOL/L (ref 94–109)
CO2 SERPL-SCNC: 24 MMOL/L (ref 20–32)
COLOR UR AUTO: ABNORMAL
CREAT SERPL-MCNC: 0.65 MG/DL (ref 0.52–1.04)
DIFFERENTIAL METHOD BLD: ABNORMAL
EOSINOPHIL # BLD AUTO: 0.2 10E9/L (ref 0–0.7)
EOSINOPHIL NFR BLD AUTO: 3.5 %
ERYTHROCYTE [DISTWIDTH] IN BLOOD BY AUTOMATED COUNT: 12.9 % (ref 10–15)
GFR SERPL CREATININE-BSD FRML MDRD: ABNORMAL ML/MIN/1.7M2
GLUCOSE SERPL-MCNC: 75 MG/DL (ref 70–99)
GLUCOSE UR STRIP-MCNC: NEGATIVE MG/DL
HCT VFR BLD AUTO: 34.2 % (ref 35–47)
HGB BLD-MCNC: 11.3 G/DL (ref 11.7–15.7)
HGB UR QL STRIP: ABNORMAL
IMM GRANULOCYTES # BLD: 0 10E9/L (ref 0–0.4)
IMM GRANULOCYTES NFR BLD: 0.2 %
KETONES UR STRIP-MCNC: NEGATIVE MG/DL
LACTATE BLD-SCNC: 0.4 MMOL/L (ref 0.7–2.1)
LEUKOCYTE ESTERASE UR QL STRIP: NEGATIVE
LYMPHOCYTES # BLD AUTO: 1.8 10E9/L (ref 0.8–5.3)
LYMPHOCYTES NFR BLD AUTO: 30.9 %
MCH RBC QN AUTO: 32.4 PG (ref 26.5–33)
MCHC RBC AUTO-ENTMCNC: 33 G/DL (ref 31.5–36.5)
MCV RBC AUTO: 98 FL (ref 78–100)
MONOCYTES # BLD AUTO: 0.5 10E9/L (ref 0–1.3)
MONOCYTES NFR BLD AUTO: 8.1 %
NEUTROPHILS # BLD AUTO: 3.2 10E9/L (ref 1.6–8.3)
NEUTROPHILS NFR BLD AUTO: 56.9 %
NITRATE UR QL: NEGATIVE
NRBC # BLD AUTO: 0 10*3/UL
NRBC BLD AUTO-RTO: 0 /100
PH UR STRIP: 6.5 PH (ref 5–7)
PLATELET # BLD AUTO: 157 10E9/L (ref 150–450)
POTASSIUM SERPL-SCNC: 4 MMOL/L (ref 3.4–5.3)
RBC # BLD AUTO: 3.49 10E12/L (ref 3.8–5.2)
RBC #/AREA URNS AUTO: <1 /HPF (ref 0–2)
SODIUM SERPL-SCNC: 143 MMOL/L (ref 133–144)
SP GR UR STRIP: 1 (ref 1–1.03)
SQUAMOUS #/AREA URNS AUTO: <1 /HPF (ref 0–1)
URN SPEC COLLECT METH UR: ABNORMAL
UROBILINOGEN UR STRIP-MCNC: NORMAL MG/DL (ref 0–2)
WBC # BLD AUTO: 5.7 10E9/L (ref 4–11)
WBC #/AREA URNS AUTO: <1 /HPF (ref 0–2)

## 2017-04-27 PROCEDURE — 25000132 ZZH RX MED GY IP 250 OP 250 PS 637: Performed by: PHYSICIAN ASSISTANT

## 2017-04-27 PROCEDURE — 85025 COMPLETE CBC W/AUTO DIFF WBC: CPT | Performed by: SURGERY

## 2017-04-27 PROCEDURE — 36415 COLL VENOUS BLD VENIPUNCTURE: CPT | Performed by: SURGERY

## 2017-04-27 PROCEDURE — 99225 ZZC SUBSEQUENT OBSERVATION CARE,LEVEL II: CPT | Performed by: PHYSICIAN ASSISTANT

## 2017-04-27 PROCEDURE — 25000128 H RX IP 250 OP 636: Performed by: PHYSICIAN ASSISTANT

## 2017-04-27 PROCEDURE — 80048 BASIC METABOLIC PNL TOTAL CA: CPT | Performed by: SURGERY

## 2017-04-27 PROCEDURE — 99231 SBSQ HOSP IP/OBS SF/LOW 25: CPT | Performed by: SURGERY

## 2017-04-27 PROCEDURE — 81001 URINALYSIS AUTO W/SCOPE: CPT | Performed by: PHYSICIAN ASSISTANT

## 2017-04-27 PROCEDURE — 85027 COMPLETE CBC AUTOMATED: CPT | Performed by: SURGERY

## 2017-04-27 PROCEDURE — 83605 ASSAY OF LACTIC ACID: CPT | Performed by: HOSPITALIST

## 2017-04-27 PROCEDURE — 36415 COLL VENOUS BLD VENIPUNCTURE: CPT | Performed by: HOSPITALIST

## 2017-04-27 PROCEDURE — G0378 HOSPITAL OBSERVATION PER HR: HCPCS

## 2017-04-27 RX ADMIN — PIPERACILLIN SODIUM,TAZOBACTAM SODIUM 3.38 G: 3; .375 INJECTION, POWDER, FOR SOLUTION INTRAVENOUS at 13:30

## 2017-04-27 RX ADMIN — VILAZODONE HYDROCHLORIDE 20 MG: 20 TABLET ORAL at 08:17

## 2017-04-27 RX ADMIN — PIPERACILLIN SODIUM,TAZOBACTAM SODIUM 3.38 G: 3; .375 INJECTION, POWDER, FOR SOLUTION INTRAVENOUS at 19:58

## 2017-04-27 RX ADMIN — PIPERACILLIN SODIUM,TAZOBACTAM SODIUM 3.38 G: 3; .375 INJECTION, POWDER, FOR SOLUTION INTRAVENOUS at 01:48

## 2017-04-27 RX ADMIN — LAMOTRIGINE 25 MG: 25 TABLET ORAL at 08:17

## 2017-04-27 RX ADMIN — SERTRALINE HYDROCHLORIDE 100 MG: 50 TABLET, FILM COATED ORAL at 08:17

## 2017-04-27 RX ADMIN — SODIUM CHLORIDE: 9 INJECTION, SOLUTION INTRAVENOUS at 09:34

## 2017-04-27 RX ADMIN — PIPERACILLIN SODIUM,TAZOBACTAM SODIUM 3.38 G: 3; .375 INJECTION, POWDER, FOR SOLUTION INTRAVENOUS at 07:07

## 2017-04-27 ASSESSMENT — PAIN DESCRIPTION - DESCRIPTORS: DESCRIPTORS: TENDER

## 2017-04-27 NOTE — PROGRESS NOTES
[facility]    Hospitalist Progress Note    Date of Service (when I saw the patient): 04/27/2017    Assessment & Plan   Lorena Corbin is a very pleasant 36-year-old female with a past medical history of Cabrales syndrome and anxiety who presented to the Emergency Department 4/26/17 with abdominal pain that began after her colonoscopy she had yesterday. She is being registered to [facility] care for further monitoring and treatment.     Abdominal pain following colonoscopy: The case was discussed with GI and this is likely due to serosal burn after a cecal polyp was removed yesterday. She has had moderate to severe bilateral lower abdominal pain starting last night and persisting into today with no associated nausea, vomiting, diarrhea or fevers. Vital signs are stable. WBC 13.3>5.7. CT abdomen and pelvis with contrast. As per Radiology, there is some inflammatory wall thickening of the cecum and distal ileum that appears distended. This is suggestive for an infectious or inflammatory ileitis and cecal colitis. Please note that the appendix cannot be adequately visualized for assessment. If there is concern for appendicitis, repeat imaging may be necessary. There is no abscess or free air. There is some small volume of free fluid in the deep pelvis with some adjacent enhancement that could represent peritonitis.  -- GI consulted, appreciate assistance   -- General Surgery consulted, appreciate assistance, recommend continuing IV antibiotics for one more day with transition to oral regimen and discharge in the AM   -- IV Zosyn   -- IVF at 125 mL/hr  -- Clear liquid diet    Anemia with hematuria: Suspect dilutional in the setting of aggressive IVF resuscitation above. 14.0>11.3. Pt does complain of some hematuria, no additional active signs of bleeding. Creatinine WNL. LMP starting around 4/6. No dysuria, increased frequency, or urgency  -- Monitor   -- Check UA   -- Monitor urine output     Anxiety.  This issue seems to be stable at this time. Prior to admission, she is on Lamictal, sertraline and Viibryd, which she will continue.      Cabrales syndrome. This is as noted above and will be managed per [facility]. The pathology from her colon polyp removal is pending.      DVT Prophylaxis: Ambulate every shift  Code Status: Full Code    Disposition: Expected discharge in the AM     Rosey Stahl PA-C    This patient was discussed with Dr. Patel of the Hospitalist Service who agrees with current plans as outlined above.     Interval History   Still having some mild pain in her bilateral lower quadrants. No associated nausea, vomiting, fever, chills. No BM since colonoscopy prep. Endorsing hematuria, no dysuria, increased frequency or urgency. LMP ~4/6.     -Data reviewed today: I reviewed all new labs and imaging results over the last 24 hours. I personally reviewed no images or EKG's today.    Physical Exam   Temp: 97.9  F (36.6  C) Temp src: Oral BP: 92/40   Heart Rate: 69 Resp: 16 SpO2: 99 % O2 Device: None (Room air)    Vitals:    04/26/17 0907 04/26/17 1338   Weight: 47.6 kg (105 lb) 46.2 kg (101 lb 12.8 oz)     Vital Signs with Ranges  Temp:  [97.3  F (36.3  C)-100.1  F (37.8  C)] 97.9  F (36.6  C)  Heart Rate:  [41-69] 69  Resp:  [16-18] 16  BP: ()/(38-59) 92/40  SpO2:  [93 %-100 %] 99 %       CONSTITUTIONAL: Pt laying in bed, dressed in hospital garb. Appears comfortable. Cooperative with interview. Accompanied by friend at bedside.  HEENT: Normocephalic, atraumatic. Negative for conjunctival redness or scleral icterus.  Oral mucosa pink and moist; negative for ulcerations, erythema, or exudates.  Dentition in good repair.   CARDIOVASCULAR: RRR, no murmurs, rubs, or extra heart sounds appreciated. Pulses +2/4 and regular in upper and lower extremities, bilaterally.   RESPIRATORY: No increased work of breathing.  CTA, bilat; no wheezes, rales, or rhonchi  appreciated.  GASTROINTESTINAL:  Abdomen soft, non-distended. BS auscultated in all four quadrants. Some tenderness upon palpation of lower quadrants bilat and pubic region. Guarding appreciated.  No masses or organomegaly noted.  MUSCULOSKELETAL: Strength +5/5 in upper and lower extremities, bilaterally. No gross deformities noted. Normal muscle tone.   HEMATOLOGIC/LYMPHATIC/IMMUNOLOGIC: Negative for lower extremity edema, bilaterally.  NEUROLOGIC: Alert and oriented to person, place, and time.  strength intact. No focal neuro deficits appreciated. Moves all four extremities without difficulty.   SKIN: Warm, dry, intact. No jaundice noted. Negative for suspicious lesions, rashes, bruising, open sores or abrasions.     Medications     NaCl 125 mL/hr at 04/27/17 0934       lamoTRIgine (LaMICtal) tablet 25 mg  25 mg Oral Daily     sertraline (ZOLOFT) tablet 100 mg  100 mg Oral Daily     vilazodone (VIIBRYD) tablet 20 mg  20 mg Oral Daily     sodium chloride (PF)  3 mL Intracatheter Q8H     piperacillin-tazobactam  3.375 g Intravenous Q6H       Data     Recent Labs  Lab 04/27/17  0545 04/26/17  0919   WBC 5.7 13.3*   HGB 11.3* 14.0   MCV 98 97    200    137   POTASSIUM 4.0 4.6   CHLORIDE 112* 102   CO2 24 30   BUN 7 7   CR 0.65 0.71   ANIONGAP 7 5   MOOKIE 8.1* 8.9   GLC 75 111*   ALBUMIN  --  4.2   PROTTOTAL  --  7.6   BILITOTAL  --  0.6   ALKPHOS  --  61   ALT  --  27   AST  --  18   LIPASE  --  127       Recent Results (from the past 24 hour(s))   CT Abdomen Pelvis w Contrast    Narrative    CT ABDOMEN AND PELVIS WITH CONTRAST  4/26/2017 10:40 AM     HISTORY: Lower abdomen pain status post screening  colonoscopy/polypectomy yesterday. Cabrales syndrome.    TECHNIQUE:  CT abdomen and pelvis with  53 mL Isovue-370 IV. Radiation  dose for this scan was reduced using automated exposure control,  adjustment of the mA and/or kV according to patient size, or iterative  reconstruction technique.    COMPARISON:  None.    FINDINGS: There is wall thickening of the cecum and distal ileum.  There is mild distention of these portions of the bowel with fluid.  Please note that the appendix cannot be confidently visualized and  therefore cannot be assessed. Underlying appendicitis is not able to  be excluded. There is free pelvic fluid of very small volume, but  there is some enhancement of the peritoneal reflection at the deep  pelvis. No abscess or free air. Liver, gallbladder, adrenals, spleen,  pancreas, and kidneys do not show any significant abnormalities. No  acute aortic abnormality is seen.      Impression    IMPRESSION:  1. Some inflammatory wall thickening of the cecum and distal ileum  that appears distended with fluid. This is suggestive for an  infectious or inflammatory ileitis and cecal colitis. Please note that  the appendix cannot be adequately visualized for assessment. If there  is concern for appendicitis, repeat imaging may be necessary.  2. There is no abscess or free air.  3. Some small volume free fluid in the deep pelvis with some adjacent  enhancement could represent a peritonitis.    TAMARA LOMBARDI MD

## 2017-04-27 NOTE — PROGRESS NOTES
Feels better but pain persists  Denies nausea  No flatus or bm  Not hungry    Af vss  abd soft, non-distended, less tender but some peritoneal signs persist  Wbc improved    A/p  Will initiate clears  Favor another day of iv abx  Discussed with patient who reluctantly agrees to plan

## 2017-04-27 NOTE — PLAN OF CARE
Problem: Goal Outcome Summary  Goal: Goal Outcome Summary  Outcome: No Change  PRIOR TO DISCHARGE     Comments:      -diagnostic tests completed: Not met; no further testing ordered  -Colorectal surgery and GI consults complete: Met: following  -vital signs normal or at patient baseline: Not met; hypotensive, will continue to monitor  -tolerating oral intake to maintain hydration: Partially met; NPO/ice chips  -adequate pain control on oral analgesics: Met; declines pain meds  -infection is improving: Partially met, will cont. To monitor.     Nurse to notify provider when observation goals have been met and patient is ready for discharge.

## 2017-04-27 NOTE — PLAN OF CARE
Problem: Goal Outcome Summary  Goal: Goal Outcome Summary  Outcome: No Change  PRIOR TO DISCHARGE     Comments:      -diagnostic tests completed: Not met; no further testing ordered  -Colorectal surgery and GI consults complete: Met: following  -vital signs normal or at patient baseline: met  -tolerating oral intake to maintain hydration: on clears   -adequate pain control on oral analgesics: Met; declines pain meds  -infection is improving: Partially met, will cont. To monitor.     Nurse to notify provider when observation goals have been met and patient is ready for discharge.

## 2017-04-27 NOTE — PROGRESS NOTES
Pt c/o hematuria which started an hour ago. Pt thinks it could be her period. Pad given. PA aware.  Will collect UA when available.

## 2017-04-27 NOTE — PLAN OF CARE
Problem: Goal Outcome Summary  Goal: Goal Outcome Summary  Outcome: Improving  A&O, BP low, bradycardic, asympotamic. Lactic checked, 0.4. BS active. Ongoing discomfort in abdomen, declines pain meds. WBC improved from 13.3 to 5.7. IV infusing. On IV antibiotics. NPO/ice chips. Voiding. GI/Surg following.  Continue to monitor.

## 2017-04-27 NOTE — PLAN OF CARE
Problem: Goal Outcome Summary  Goal: Goal Outcome Summary  Outcome: No Change  PRIOR TO DISCHARGE     Comments:      -diagnostic tests completed: met  -Colorectal surgery and GI consults complete: Met  -vital signs normal or at patient baseline: met  -tolerating oral intake to maintain hydration: on clears   -adequate pain control on oral analgesics: Met; declines pain meds  -infection is improving: Partially met, will cont. To monitor.     Nurse to notify provider when observation goals have been met and patient is ready for discharge.

## 2017-04-27 NOTE — PLAN OF CARE
Problem: Goal Outcome Summary  Goal: Goal Outcome Summary  Outcome: Improving  Pt doing well. A/o. VSS. Some tenderness at the abdomen. No intervention. Declines meds. Up adlib. IV infusing. Started on clears. Tolerating well. Pt had couple episodes of loose stool and hematuria this am. Now resolving per pt. Anticipating dc tomorrow.

## 2017-04-28 VITALS
WEIGHT: 101.8 LBS | HEIGHT: 67 IN | SYSTOLIC BLOOD PRESSURE: 101 MMHG | HEART RATE: 70 BPM | DIASTOLIC BLOOD PRESSURE: 41 MMHG | BODY MASS INDEX: 15.98 KG/M2 | OXYGEN SATURATION: 99 % | TEMPERATURE: 97.1 F | RESPIRATION RATE: 16 BRPM

## 2017-04-28 PROCEDURE — 25000132 ZZH RX MED GY IP 250 OP 250 PS 637: Performed by: PHYSICIAN ASSISTANT

## 2017-04-28 PROCEDURE — 99217 ZZC OBSERVATION CARE DISCHARGE: CPT | Performed by: PHYSICIAN ASSISTANT

## 2017-04-28 PROCEDURE — 25000128 H RX IP 250 OP 636: Performed by: PHYSICIAN ASSISTANT

## 2017-04-28 PROCEDURE — G0378 HOSPITAL OBSERVATION PER HR: HCPCS

## 2017-04-28 PROCEDURE — 99231 SBSQ HOSP IP/OBS SF/LOW 25: CPT | Performed by: SURGERY

## 2017-04-28 RX ORDER — METRONIDAZOLE 500 MG/1
500 TABLET ORAL 3 TIMES DAILY
Qty: 21 TABLET | Refills: 0 | Status: SHIPPED | OUTPATIENT
Start: 2017-04-28 | End: 2017-05-05

## 2017-04-28 RX ORDER — CIPROFLOXACIN 500 MG/1
500 TABLET, FILM COATED ORAL 2 TIMES DAILY
Qty: 14 TABLET | Refills: 0 | Status: SHIPPED | OUTPATIENT
Start: 2017-04-28

## 2017-04-28 RX ADMIN — LAMOTRIGINE 25 MG: 25 TABLET ORAL at 08:15

## 2017-04-28 RX ADMIN — SODIUM CHLORIDE: 9 INJECTION, SOLUTION INTRAVENOUS at 03:41

## 2017-04-28 RX ADMIN — PIPERACILLIN SODIUM,TAZOBACTAM SODIUM 3.38 G: 3; .375 INJECTION, POWDER, FOR SOLUTION INTRAVENOUS at 02:07

## 2017-04-28 RX ADMIN — VILAZODONE HYDROCHLORIDE 20 MG: 20 TABLET ORAL at 08:15

## 2017-04-28 RX ADMIN — SERTRALINE HYDROCHLORIDE 100 MG: 50 TABLET, FILM COATED ORAL at 08:15

## 2017-04-28 RX ADMIN — PIPERACILLIN SODIUM,TAZOBACTAM SODIUM 3.38 G: 3; .375 INJECTION, POWDER, FOR SOLUTION INTRAVENOUS at 06:41

## 2017-04-28 NOTE — DISCHARGE SUMMARY
[facility]    Discharge Summary  Hospitalist    Date of Admission:  4/26/2017  Date of Discharge:  4/28/2017  Discharging Provider: Rosey Stahl PA-C  Date of Service (when I saw the patient): 04/28/17    Discharge Diagnoses   Abdominal pain following colonoscopy, concern for peritonitis     History of Present Illness   Lorena Corbin is a very pleasant 36-year-old female with a past medical history of Cabrales syndrome and anxiety who presented to the Emergency Department 4/26/17 with abdominal pain that began after her colonoscopy she had yesterday. She is being registered to observation care for further monitoring and treatment. See H&P by Hakeem Courtney PA-C from 4/26/17 for complete details.     Mild tenderness this AM, much improved since admission. Advancing diet as tolerated. No nausea. Ok to discharge per General Surgery     Hospital Course   Stephanie Corbin was admitted on 4/26/2017.  The following problems were addressed during her hospitalization:    Abdominal pain following colonoscopy: The case was discussed with GI and this is likely due to serosal burn after a cecal polyp was removed yesterday. She has had moderate to severe bilateral lower abdominal pain starting last night and persisting into today with no associated nausea, vomiting, diarrhea or fevers. Vital signs are stable. WBC 13.3>5.7. CT abdomen and pelvis with contrast. As per Radiology, there is some inflammatory wall thickening of the cecum and distal ileum that appears distended. This is suggestive for an infectious or inflammatory ileitis and cecal colitis. Please note that the appendix cannot be adequately visualized for assessment. If there is concern for appendicitis, repeat imaging may be necessary. There is no abscess or free air. There is some small volume of free fluid in the deep pelvis with some adjacent enhancement that could represent peritonitis. Gastroenterology and General surgery followed  patient throughout her stay.   -- Follow-up with PCP in the next week to discuss hospitalizatoin  -- Continue all PTA medications without change   -- IV Zosyn transitioned to oral cipro and flagyl X7 days; prescriptions sent at discharge, take as instructed.   -- Advance diet as tolerated      Anemia  Hematuria, resolved: Suspect dilutional in the setting of aggressive IVF resuscitation above. 14.0>11.3. Pt does complain of some hematuria, no additional active signs of bleeding. Creatinine WNL. LMP starting around 4/6. No dysuria, increased frequency, or urgency. UA unremarkable.     Recent Labs  Lab 04/27/17  0545 04/26/17  0919   HGB 11.3* 14.0       Anxiety. This issue seems to be stable at this time. Prior to admission, she is on Lamictal, sertraline and Viibryd, which she will continue.      Cabrales syndrome. This is as noted above and will be managed per [facility]. Pathology showing serrated adenoma     Rosey Stahl PA-C    This patient was discussed with Dr. Patel of the Hospitalist Service who agrees with current plans as outlined above.     Significant Results and Procedures   See below     Pending Results   These results will be followed up by PCP   Unresulted Labs Ordered in the Past 30 Days of this Admission     No orders found from 2/25/2017 to 4/27/2017.        Code Status   Full CodeFull Code       Primary Care Physician   [facility]    Physical Exam   Temp: 97.1  F (36.2  C) Temp src: Oral BP: 101/41   Heart Rate: 44 Resp: 16 SpO2: 99 % O2 Device: None (Room air)    Vitals:    04/26/17 0907 04/26/17 1338   Weight: 47.6 kg (105 lb) 46.2 kg (101 lb 12.8 oz)     Vital Signs with Ranges  Temp:  [97.1  F (36.2  C)-98.1  F (36.7  C)] 97.1  F (36.2  C)  Heart Rate:  [37-72] 44  Resp:  [16-18] 16  BP: (101-123)/(41-47) 101/41  SpO2:  [99 %-100 %] 99 %  I/O last 3 completed shifts:  In: 1200 [P.O.:200; I.V.:1000]  Out: 300 [Urine:300]    CONSTITUTIONAL: Pt  laying in bed, dressed in hospital garb. Appears comfortable. Cooperative with interview. Accompanied by friend at bedside.  HEENT: Normocephalic, atraumatic. Negative for conjunctival redness or scleral icterus. Oral mucosa pink and moist; negative for ulcerations, erythema, or exudates. Dentition in good repair.   CARDIOVASCULAR: RRR, no murmurs, rubs, or extra heart sounds appreciated. Pulses +2/4 and regular in upper and lower extremities, bilaterally.   RESPIRATORY: No increased work of breathing. CTA, bilat; no wheezes, rales, or rhonchi appreciated.  GASTROINTESTINAL: Abdomen soft, non-distended. BS auscultated in all four quadrants. Minimal tenderness upon palpation.   MUSCULOSKELETAL: Strength +5/5 in upper and lower extremities, bilaterally. No gross deformities noted. Normal muscle tone.   HEMATOLOGIC/LYMPHATIC/IMMUNOLOGIC: Negative for lower extremity edema, bilaterally.  NEUROLOGIC: Alert and oriented to person, place, and time.  strength intact. No focal neuro deficits appreciated. Moves all four extremities without difficulty.   SKIN: Warm, dry, intact. No jaundice noted. Negative for suspicious lesions, rashes, bruising, open sores or abrasions.      Discharge Disposition   Discharged to home  Condition at discharge: Stable    Consultations This Hospital Stay   COLORECTAL SURGERY IP CONSULT  GASTROENTEROLOGY IP CONSULT    Time Spent on this Encounter   I, Rosey Stahl, personally saw the patient today and spent less than or equal to 30 minutes discharging this patient.    Discharge Orders     Reason for your hospital stay   You were hospitalized for further evaluation and treatment of abdominal pain.     Follow-up and recommended labs and tests    Follow up with primary care provider, [facility], within 7 days for hospital follow- up.  No follow up labs or test are needed.     Activity   Your activity upon discharge: activity as tolerated     Discharge Instructions    1) Follow-up with your primary care provider in the next week to discuss hospitalization   2) Continue prior to admission medications   3) Cipro and Flagyl (antibiotics) prescribed at discharge; take as instructed   4) Advance diet as tolerated     Full Code     Diet   Follow this diet upon discharge: Advance diet as tolerated       Discharge Medications   Current Discharge Medication List      START taking these medications    Details   ciprofloxacin (CIPRO) 500 MG tablet Take 1 tablet (500 mg) by mouth 2 times daily  Qty: 14 tablet, Refills: 0    Associated Diagnoses: Peritonitis (H)      metroNIDAZOLE (FLAGYL) 500 MG tablet Take 1 tablet (500 mg) by mouth 3 times daily for 7 days  Qty: 21 tablet, Refills: 0    Associated Diagnoses: Peritonitis (H)         CONTINUE these medications which have NOT CHANGED    Details   LAMOTRIGINE PO Take 25 mg by mouth daily Starting 4/29 to increase to 50mg daily      Vilazodone HCl (VIIBRYD PO) Take 20 mg by mouth daily Suppose to increase to 40mg daily on 4/29/17      SERTRALINE HCL PO Take 100 mg by mouth daily Weaning off after 2 more doses      Prenatal Vit-Fe Fumarate-FA (PRENATAL MULTIVITAMIN  PLUS IRON) 27-0.8 MG TABS per tablet Take 1 tablet by mouth daily      Probiotic Product (PROBIOTIC DAILY PO) Take 1 tablet by mouth daily           Allergies   Allergies   Allergen Reactions     Reglan [Metoclopramide]      Data   Most Recent 3 CBC's:  Recent Labs   Lab Test  04/27/17   0545  04/26/17   0919   WBC  5.7  13.3*   HGB  11.3*  14.0   MCV  98  97   PLT  157  200      Most Recent 3 BMP's:  Recent Labs   Lab Test  04/27/17   0545  04/26/17   0919   NA  143  137   POTASSIUM  4.0  4.6   CHLORIDE  112*  102   CO2  24  30   BUN  7  7   CR  0.65  0.71   ANIONGAP  7  5   MOOKIE  8.1*  8.9   GLC  75  111*     Most Recent 2 LFT's:  Recent Labs   Lab Test  04/26/17   0919   AST  18   ALT  27   ALKPHOS  61   BILITOTAL  0.6     Most Recent INR's and Anticoagulation Dosing  History:  Anticoagulation Dose History     There is no flowsheet data to display.        Most Recent 3 Troponin's:No lab results found.  Most Recent Cholesterol Panel:No lab results found.  Most Recent 6 Bacteria Isolates From Any Culture (See EPIC Reports for Culture Details):No lab results found.  Most Recent TSH, T4 and A1c Labs:No lab results found.  Results for orders placed or performed during the hospital encounter of 04/26/17   CT Abdomen Pelvis w Contrast    Narrative    CT ABDOMEN AND PELVIS WITH CONTRAST  4/26/2017 10:40 AM     HISTORY: Lower abdomen pain status post screening  colonoscopy/polypectomy yesterday. Cabrales syndrome.    TECHNIQUE:  CT abdomen and pelvis with  53 mL Isovue-370 IV. Radiation  dose for this scan was reduced using automated exposure control,  adjustment of the mA and/or kV according to patient size, or iterative  reconstruction technique.    COMPARISON: None.    FINDINGS: There is wall thickening of the cecum and distal ileum.  There is mild distention of these portions of the bowel with fluid.  Please note that the appendix cannot be confidently visualized and  therefore cannot be assessed. Underlying appendicitis is not able to  be excluded. There is free pelvic fluid of very small volume, but  there is some enhancement of the peritoneal reflection at the deep  pelvis. No abscess or free air. Liver, gallbladder, adrenals, spleen,  pancreas, and kidneys do not show any significant abnormalities. No  acute aortic abnormality is seen.      Impression    IMPRESSION:  1. Some inflammatory wall thickening of the cecum and distal ileum  that appears distended with fluid. This is suggestive for an  infectious or inflammatory ileitis and cecal colitis. Please note that  the appendix cannot be adequately visualized for assessment. If there  is concern for appendicitis, repeat imaging may be necessary.  2. There is no abscess or free air.  3. Some small volume free fluid in the deep pelvis  with some adjacent  enhancement could represent a peritonitis.    TAMARA LOMBARDI MD

## 2017-04-28 NOTE — PLAN OF CARE
Problem: Discharge Planning  Goal: Discharge Planning (Adult, OB, Behavioral, Peds)  Outcome: No Change  Patient is alert and orientated x 4.  Continues with some abdominal tenderness.  No N, V, D at this time.  She is on a clear liquid diet and is taking some jello presently.  She is not taking any pain meds.

## 2017-04-28 NOTE — PLAN OF CARE
Problem: Goal Outcome Summary  Goal: Goal Outcome Summary  Outcome: Adequate for Discharge Date Met:  04/28/17  Pt discharged to home at 1015 with private transportation. Verbalized understanding of d/c plan including new meds (filled Rx sent with pt), monitoring, and followup; questions answered.  Denies pain.  Taking good PO food and fluid intake, passing flatus, and voiding adequately.  Ambulates independently.

## 2017-04-28 NOTE — PLAN OF CARE
Problem: Discharge Planning  Goal: Discharge Planning (Adult, OB, Behavioral, Peds)  Outcome: No Change  Peritonitis after coloscopy  Patient continues alert and orientated x 4.  Up independently.  States her abdominal discomfort is less.  Continues on IV antibiotics.  +BS x 4.  No N, V, or D.

## 2017-04-28 NOTE — PROGRESS NOTES
Doing well  Continues to feel better  Minimal pain  Tolerating clears  Af vss  abd much less tender, soft    A/p  Improved  Soft diet  Okay to d/c  Recommend po abx for 1 week

## 2017-04-28 NOTE — PLAN OF CARE
GOALS TO BE MET PRIOR TO DISCHARGE       -diagnostic tests completed:  Met.    -Colorectal surgery and GI consults complete:   Met.    -vital signs normal or at patient baseline:   Met.    -tolerating oral intake to maintain hydration:   Met.     -adequate pain control on oral analgesics:   Met, denies pain.    -infection is improving:      Nurse to notify provider when observation goals have been met and patient is ready for discharge.

## 2019-08-23 ENCOUNTER — TRANSFERRED RECORDS (OUTPATIENT)
Dept: HEALTH INFORMATION MANAGEMENT | Facility: CLINIC | Age: 39
End: 2019-08-23

## 2019-08-23 LAB
HPV ABSTRACT: NORMAL
PAP SMEAR - HIM PATIENT REPORTED: NEGATIVE

## 2020-01-22 ENCOUNTER — OFFICE VISIT (OUTPATIENT)
Dept: FAMILY MEDICINE | Facility: CLINIC | Age: 40
End: 2020-01-22
Payer: COMMERCIAL

## 2020-01-22 VITALS
OXYGEN SATURATION: 100 % | BODY MASS INDEX: 17.89 KG/M2 | HEIGHT: 67 IN | SYSTOLIC BLOOD PRESSURE: 124 MMHG | DIASTOLIC BLOOD PRESSURE: 73 MMHG | TEMPERATURE: 96.7 F | HEART RATE: 46 BPM | WEIGHT: 114 LBS

## 2020-01-22 DIAGNOSIS — F50.019 ANOREXIA NERVOSA, RESTRICTING TYPE: Primary | ICD-10-CM

## 2020-01-22 LAB
BASOPHILS # BLD AUTO: 0 10E9/L (ref 0–0.2)
BASOPHILS NFR BLD AUTO: 0.8 %
CORTIS SERPL-MCNC: 12.1 UG/DL (ref 4–22)
DIFFERENTIAL METHOD BLD: ABNORMAL
EOSINOPHIL # BLD AUTO: 0.1 10E9/L (ref 0–0.7)
EOSINOPHIL NFR BLD AUTO: 2.3 %
ERYTHROCYTE [DISTWIDTH] IN BLOOD BY AUTOMATED COUNT: 12.3 % (ref 10–15)
FOLATE SERPL-MCNC: 11.5 NG/ML
HCT VFR BLD AUTO: 38.5 % (ref 35–47)
HGB BLD-MCNC: 12.5 G/DL (ref 11.7–15.7)
INR PPP: 1.02 (ref 0.86–1.14)
LYMPHOCYTES # BLD AUTO: 1.4 10E9/L (ref 0.8–5.3)
LYMPHOCYTES NFR BLD AUTO: 29.8 %
MCH RBC QN AUTO: 32.8 PG (ref 26.5–33)
MCHC RBC AUTO-ENTMCNC: 32.5 G/DL (ref 31.5–36.5)
MCV RBC AUTO: 101 FL (ref 78–100)
MONOCYTES # BLD AUTO: 0.4 10E9/L (ref 0–1.3)
MONOCYTES NFR BLD AUTO: 7.6 %
NEUTROPHILS # BLD AUTO: 2.8 10E9/L (ref 1.6–8.3)
NEUTROPHILS NFR BLD AUTO: 59.5 %
PLATELET # BLD AUTO: 298 10E9/L (ref 150–450)
RBC # BLD AUTO: 3.81 10E12/L (ref 3.8–5.2)
RETICS # AUTO: 64.3 10E9/L (ref 25–95)
RETICS/RBC NFR AUTO: 1.6 % (ref 0.5–2)
VIT B12 SERPL-MCNC: 811 PG/ML (ref 193–986)
WBC # BLD AUTO: 4.8 10E9/L (ref 4–11)

## 2020-01-22 PROCEDURE — 84425 ASSAY OF VITAMIN B-1: CPT | Mod: 90 | Performed by: INTERNAL MEDICINE

## 2020-01-22 PROCEDURE — 93000 ELECTROCARDIOGRAM COMPLETE: CPT | Performed by: INTERNAL MEDICINE

## 2020-01-22 PROCEDURE — 80048 BASIC METABOLIC PNL TOTAL CA: CPT | Performed by: INTERNAL MEDICINE

## 2020-01-22 PROCEDURE — 85025 COMPLETE CBC W/AUTO DIFF WBC: CPT | Performed by: INTERNAL MEDICINE

## 2020-01-22 PROCEDURE — 82728 ASSAY OF FERRITIN: CPT | Performed by: INTERNAL MEDICINE

## 2020-01-22 PROCEDURE — 99203 OFFICE O/P NEW LOW 30 MIN: CPT | Performed by: INTERNAL MEDICINE

## 2020-01-22 PROCEDURE — 82306 VITAMIN D 25 HYDROXY: CPT | Performed by: INTERNAL MEDICINE

## 2020-01-22 PROCEDURE — 85610 PROTHROMBIN TIME: CPT | Performed by: INTERNAL MEDICINE

## 2020-01-22 PROCEDURE — 80076 HEPATIC FUNCTION PANEL: CPT | Performed by: INTERNAL MEDICINE

## 2020-01-22 PROCEDURE — 82746 ASSAY OF FOLIC ACID SERUM: CPT | Performed by: INTERNAL MEDICINE

## 2020-01-22 PROCEDURE — 82607 VITAMIN B-12: CPT | Performed by: INTERNAL MEDICINE

## 2020-01-22 PROCEDURE — 83735 ASSAY OF MAGNESIUM: CPT | Performed by: INTERNAL MEDICINE

## 2020-01-22 PROCEDURE — 83550 IRON BINDING TEST: CPT | Performed by: INTERNAL MEDICINE

## 2020-01-22 PROCEDURE — 84100 ASSAY OF PHOSPHORUS: CPT | Performed by: INTERNAL MEDICINE

## 2020-01-22 PROCEDURE — 84134 ASSAY OF PREALBUMIN: CPT | Performed by: INTERNAL MEDICINE

## 2020-01-22 PROCEDURE — 36415 COLL VENOUS BLD VENIPUNCTURE: CPT | Performed by: INTERNAL MEDICINE

## 2020-01-22 PROCEDURE — 84443 ASSAY THYROID STIM HORMONE: CPT | Performed by: INTERNAL MEDICINE

## 2020-01-22 PROCEDURE — 83540 ASSAY OF IRON: CPT | Performed by: INTERNAL MEDICINE

## 2020-01-22 PROCEDURE — 82533 TOTAL CORTISOL: CPT | Performed by: INTERNAL MEDICINE

## 2020-01-22 PROCEDURE — 99000 SPECIMEN HANDLING OFFICE-LAB: CPT | Performed by: INTERNAL MEDICINE

## 2020-01-22 PROCEDURE — 85045 AUTOMATED RETICULOCYTE COUNT: CPT | Performed by: INTERNAL MEDICINE

## 2020-01-22 RX ORDER — NORETHINDRONE ACETATE AND ETHINYL ESTRADIOL 1; 20 MG/1; UG/1
TABLET ORAL
COMMUNITY
Start: 2020-01-10

## 2020-01-22 ASSESSMENT — MIFFLIN-ST. JEOR: SCORE: 1218.6

## 2020-01-22 NOTE — PATIENT INSTRUCTIONS
Proceed with your bone density scan.  [address]  Appointment Tel No: [phone], Main Tel No: [phone]    Schedule your echocardiogram.   [phone]    Seek immediate medical attention if you develop fever/chills, chest pain, palpitations, worsening shortness of breath/cough, persistent vomiting/diarrhea, blood in your stools/urine, no urine output in 24 hours, increasing weakness/lethargy.

## 2020-01-22 NOTE — PROGRESS NOTES
"Subjective     Stephanie Corbin is a 39 year old female who presents to clinic today for the following health issues:    HPI   New Patient/Transfer of Care    Has history of anorexia.      Past Medical History:   Diagnosis Date     Anxiety        Review of Systems   Constitutional: Negative for fatigue.   Eyes: Negative for visual disturbance.   Respiratory: Negative for shortness of breath.    Cardiovascular: Negative for chest pain, palpitations and leg swelling.   Gastrointestinal: Negative for abdominal pain, nausea and vomiting.   Neurological: Negative for dizziness, weakness, light-headedness, numbness and headaches.       /73 (BP Location: Left arm, Patient Position: Sitting, Cuff Size: Adult Regular)   Pulse (!) 46   Temp 96.7  F (35.9  C) (Tympanic)   Ht 1.7 m (5' 6.93\")   Wt 51.7 kg (114 lb)   SpO2 100%   BMI 17.89 kg/m      Physical Exam  Vitals signs and nursing note reviewed.   Constitutional:       General: She is not in acute distress.  Eyes:      Pupils: Pupils are equal, round, and reactive to light.   Neck:      Thyroid: No thyromegaly.   Cardiovascular:      Rate and Rhythm: Normal rate and regular rhythm.      Heart sounds: Normal heart sounds.   Pulmonary:      Effort: Pulmonary effort is normal. No respiratory distress.      Breath sounds: Normal breath sounds.   Musculoskeletal:         General: No edema.   Neurological:      Mental Status: She is alert and oriented to person, place, and time.      Coordination: Coordination normal.           ICD-10-CM    1. Anorexia nervosa, restricting type F50.01 EKG 12-lead complete w/read - Clinics     CBC with platelets differential     TSH with free T4 reflex     Vitamin D Deficiency     Vitamin B12     Iron and iron binding capacity     Ferritin     Folate     Reticulocyte count     Magnesium     Cortisol     DX Hip/Pelvis/Spine     Vitamin B1 plasma     Phosphorus     Basic metabolic panel     Hepatic panel     Prealbumin     INR     " Echocardiogram Complete     Vitamin B1 whole blood     *45 minutes were spent with the patient, more than half of which was spent on counseling and coordination of care      Patient Instructions   Proceed with your bone density scan.  [address]  Appointment Tel No: [phone], Main Tel No: [phone]    Schedule your echocardiogram.   [phone]    Seek immediate medical attention if you develop fever/chills, chest pain, palpitations, worsening shortness of breath/cough, persistent vomiting/diarrhea, blood in your stools/urine, no urine output in 24 hours, increasing weakness/lethargy.

## 2020-01-23 LAB
ALBUMIN SERPL-MCNC: 3.8 G/DL (ref 3.4–5)
ALP SERPL-CCNC: 52 U/L (ref 40–150)
ALT SERPL W P-5'-P-CCNC: 87 U/L (ref 0–50)
ANION GAP SERPL CALCULATED.3IONS-SCNC: 8 MMOL/L (ref 3–14)
AST SERPL W P-5'-P-CCNC: 68 U/L (ref 0–45)
BILIRUB DIRECT SERPL-MCNC: 0.1 MG/DL (ref 0–0.2)
BILIRUB SERPL-MCNC: 0.3 MG/DL (ref 0.2–1.3)
BUN SERPL-MCNC: 10 MG/DL (ref 7–30)
CALCIUM SERPL-MCNC: 9 MG/DL (ref 8.5–10.1)
CHLORIDE SERPL-SCNC: 105 MMOL/L (ref 94–109)
CO2 SERPL-SCNC: 24 MMOL/L (ref 20–32)
CREAT SERPL-MCNC: 0.68 MG/DL (ref 0.52–1.04)
DEPRECATED CALCIDIOL+CALCIFEROL SERPL-MC: 27 UG/L (ref 20–75)
FERRITIN SERPL-MCNC: 46 NG/ML (ref 12–150)
GFR SERPL CREATININE-BSD FRML MDRD: >90 ML/MIN/{1.73_M2}
GLUCOSE SERPL-MCNC: 84 MG/DL (ref 70–99)
IRON SATN MFR SERPL: 37 % (ref 15–46)
IRON SERPL-MCNC: 142 UG/DL (ref 35–180)
MAGNESIUM SERPL-MCNC: 1.9 MG/DL (ref 1.6–2.3)
PHOSPHATE SERPL-MCNC: 4.2 MG/DL (ref 2.5–4.5)
POTASSIUM SERPL-SCNC: 4 MMOL/L (ref 3.4–5.3)
PREALB SERPL IA-MCNC: 26 MG/DL (ref 15–45)
PROT SERPL-MCNC: 7.5 G/DL (ref 6.8–8.8)
SODIUM SERPL-SCNC: 137 MMOL/L (ref 133–144)
TIBC SERPL-MCNC: 386 UG/DL (ref 240–430)
TSH SERPL DL<=0.005 MIU/L-ACNC: 1 MU/L (ref 0.4–4)

## 2020-01-28 ENCOUNTER — APPOINTMENT (OUTPATIENT)
Dept: CT IMAGING | Facility: CLINIC | Age: 40
End: 2020-01-28
Attending: EMERGENCY MEDICINE
Payer: COMMERCIAL

## 2020-01-28 ENCOUNTER — HOSPITAL ENCOUNTER (EMERGENCY)
Facility: CLINIC | Age: 40
Discharge: HOME | End: 2020-01-28
Attending: EMERGENCY MEDICINE | Admitting: EMERGENCY MEDICINE
Payer: COMMERCIAL

## 2020-01-28 ENCOUNTER — TELEPHONE (OUTPATIENT)
Dept: EMERGENCY MEDICINE | Facility: CLINIC | Age: 40
End: 2020-01-28

## 2020-01-28 VITALS
TEMPERATURE: 98.2 F | WEIGHT: 110 LBS | RESPIRATION RATE: 20 BRPM | SYSTOLIC BLOOD PRESSURE: 124 MMHG | OXYGEN SATURATION: 100 % | HEART RATE: 71 BPM | DIASTOLIC BLOOD PRESSURE: 63 MMHG | HEIGHT: 68 IN | BODY MASS INDEX: 16.67 KG/M2

## 2020-01-28 DIAGNOSIS — R42 VERTIGO: ICD-10-CM

## 2020-01-28 DIAGNOSIS — H53.8 BLURRED VISION: ICD-10-CM

## 2020-01-28 LAB
ANION GAP SERPL CALCULATED.3IONS-SCNC: 5 MMOL/L (ref 3–14)
APTT PPP: 27 SEC (ref 22–37)
BASOPHILS # BLD AUTO: 0 10E9/L (ref 0–0.2)
BASOPHILS NFR BLD AUTO: 0.5 %
BUN SERPL-MCNC: 6 MG/DL (ref 7–30)
CALCIUM SERPL-MCNC: 8.8 MG/DL (ref 8.5–10.1)
CHLORIDE SERPL-SCNC: 100 MMOL/L (ref 94–109)
CO2 SERPL-SCNC: 27 MMOL/L (ref 20–32)
CREAT SERPL-MCNC: 0.67 MG/DL (ref 0.52–1.04)
DIFFERENTIAL METHOD BLD: ABNORMAL
EOSINOPHIL # BLD AUTO: 0.1 10E9/L (ref 0–0.7)
EOSINOPHIL NFR BLD AUTO: 2 %
ERYTHROCYTE [DISTWIDTH] IN BLOOD BY AUTOMATED COUNT: 12.4 % (ref 10–15)
GFR SERPL CREATININE-BSD FRML MDRD: >90 ML/MIN/{1.73_M2}
GLUCOSE SERPL-MCNC: 93 MG/DL (ref 70–99)
HCT VFR BLD AUTO: 37.1 % (ref 35–47)
HGB BLD-MCNC: 12.7 G/DL (ref 11.7–15.7)
IMM GRANULOCYTES # BLD: 0 10E9/L (ref 0–0.4)
IMM GRANULOCYTES NFR BLD: 0.2 %
INR PPP: 0.92 (ref 0.86–1.14)
INTERPRETATION ECG - MUSE: NORMAL
LYMPHOCYTES # BLD AUTO: 2.3 10E9/L (ref 0.8–5.3)
LYMPHOCYTES NFR BLD AUTO: 40.5 %
MCH RBC QN AUTO: 33.4 PG (ref 26.5–33)
MCHC RBC AUTO-ENTMCNC: 34.2 G/DL (ref 31.5–36.5)
MCV RBC AUTO: 98 FL (ref 78–100)
MONOCYTES # BLD AUTO: 0.9 10E9/L (ref 0–1.3)
MONOCYTES NFR BLD AUTO: 15.3 %
NEUTROPHILS # BLD AUTO: 2.3 10E9/L (ref 1.6–8.3)
NEUTROPHILS NFR BLD AUTO: 41.5 %
NRBC # BLD AUTO: 0 10*3/UL
NRBC BLD AUTO-RTO: 0 /100
PLATELET # BLD AUTO: 279 10E9/L (ref 150–450)
POTASSIUM SERPL-SCNC: 4.8 MMOL/L (ref 3.4–5.3)
RBC # BLD AUTO: 3.8 10E12/L (ref 3.8–5.2)
SODIUM SERPL-SCNC: 132 MMOL/L (ref 133–144)
WBC # BLD AUTO: 5.6 10E9/L (ref 4–11)

## 2020-01-28 PROCEDURE — 96374 THER/PROPH/DIAG INJ IV PUSH: CPT | Mod: 59

## 2020-01-28 PROCEDURE — 85610 PROTHROMBIN TIME: CPT | Performed by: EMERGENCY MEDICINE

## 2020-01-28 PROCEDURE — 25000132 ZZH RX MED GY IP 250 OP 250 PS 637: Performed by: EMERGENCY MEDICINE

## 2020-01-28 PROCEDURE — 85025 COMPLETE CBC W/AUTO DIFF WBC: CPT | Performed by: EMERGENCY MEDICINE

## 2020-01-28 PROCEDURE — 80048 BASIC METABOLIC PNL TOTAL CA: CPT | Performed by: EMERGENCY MEDICINE

## 2020-01-28 PROCEDURE — 85730 THROMBOPLASTIN TIME PARTIAL: CPT | Performed by: EMERGENCY MEDICINE

## 2020-01-28 PROCEDURE — 25000128 H RX IP 250 OP 636: Performed by: EMERGENCY MEDICINE

## 2020-01-28 PROCEDURE — 93005 ELECTROCARDIOGRAM TRACING: CPT

## 2020-01-28 PROCEDURE — 70450 CT HEAD/BRAIN W/O DYE: CPT | Mod: XS

## 2020-01-28 PROCEDURE — 99291 CRITICAL CARE FIRST HOUR: CPT | Performed by: PHYSICIAN ASSISTANT

## 2020-01-28 PROCEDURE — 25000125 ZZHC RX 250: Performed by: EMERGENCY MEDICINE

## 2020-01-28 PROCEDURE — 99285 EMERGENCY DEPT VISIT HI MDM: CPT | Mod: 25

## 2020-01-28 PROCEDURE — 70496 CT ANGIOGRAPHY HEAD: CPT

## 2020-01-28 RX ORDER — MECLIZINE HYDROCHLORIDE 25 MG/1
25 TABLET ORAL 3 TIMES DAILY PRN
Qty: 30 TABLET | Refills: 0 | Status: SHIPPED | OUTPATIENT
Start: 2020-01-28

## 2020-01-28 RX ORDER — ONDANSETRON 4 MG/1
4 TABLET, ORALLY DISINTEGRATING ORAL EVERY 6 HOURS PRN
Qty: 10 TABLET | Refills: 0 | Status: SHIPPED | OUTPATIENT
Start: 2020-01-28 | End: 2020-01-31

## 2020-01-28 RX ORDER — IOPAMIDOL 755 MG/ML
120 INJECTION, SOLUTION INTRAVASCULAR ONCE
Status: DISCONTINUED | OUTPATIENT
Start: 2020-01-28 | End: 2020-01-28

## 2020-01-28 RX ORDER — LORAZEPAM 1 MG/1
1 TABLET ORAL ONCE
Status: COMPLETED | OUTPATIENT
Start: 2020-01-28 | End: 2020-01-28

## 2020-01-28 RX ORDER — IOPAMIDOL 755 MG/ML
70 INJECTION, SOLUTION INTRAVASCULAR ONCE
Status: COMPLETED | OUTPATIENT
Start: 2020-01-28 | End: 2020-01-28

## 2020-01-28 RX ORDER — ONDANSETRON 2 MG/ML
4 INJECTION INTRAMUSCULAR; INTRAVENOUS ONCE
Status: COMPLETED | OUTPATIENT
Start: 2020-01-28 | End: 2020-01-28

## 2020-01-28 RX ORDER — MECLIZINE HYDROCHLORIDE 25 MG/1
25 TABLET ORAL ONCE
Status: COMPLETED | OUTPATIENT
Start: 2020-01-28 | End: 2020-01-28

## 2020-01-28 RX ADMIN — ONDANSETRON 4 MG: 2 INJECTION INTRAMUSCULAR; INTRAVENOUS at 10:34

## 2020-01-28 RX ADMIN — MECLIZINE HYDROCHLORIDE 25 MG: 25 TABLET ORAL at 10:47

## 2020-01-28 RX ADMIN — LORAZEPAM 1 MG: 1 TABLET ORAL at 11:21

## 2020-01-28 RX ADMIN — IOPAMIDOL 70 ML: 755 INJECTION, SOLUTION INTRAVENOUS at 10:25

## 2020-01-28 RX ADMIN — SODIUM CHLORIDE 100 ML: 9 INJECTION, SOLUTION INTRAVENOUS at 10:24

## 2020-01-28 ASSESSMENT — ENCOUNTER SYMPTOMS
DIZZINESS: 1
WEAKNESS: 0
FEVER: 0
HEADACHES: 1
ABDOMINAL PAIN: 0
SHORTNESS OF BREATH: 0
NUMBNESS: 0

## 2020-01-28 ASSESSMENT — MIFFLIN-ST. JEOR: SCORE: 1223.81

## 2020-01-28 NOTE — ED NOTES
"Pt able to ambulate to and from bathroom with standby assist. Pt able to walk at a normal pace, but exhibited an abnormal gait (very heavy footsteps with some seeming lack of coordination). Pt states \"the walk went well... much better than before\". Pt assisted back into bed and placed on monitors. Pt denies other needs at this time.  "

## 2020-01-28 NOTE — ED AVS SNAPSHOT
Emergency Department  [address] [place] [id number]  Phone:  [phone]  Fax:  [phone]                                    Stephanie Corbin   MRN: [id number]    Department:   Emergency Department   Date of Visit:  1/28/2020           After Visit Summary Signature Page    I have received my discharge instructions, and my questions have been answered. I have discussed any challenges I see with this plan with the nurse or doctor.    ..........................................................................................................................................  Patient/Patient Representative Signature      ..........................................................................................................................................  Patient Representative Print Name and Relationship to Patient    ..................................................               ................................................  Date                                   Time    ..........................................................................................................................................  Reviewed by Signature/Title    ...................................................              ..............................................  Date                                               Time          22EPIC Rev 08/18

## 2020-01-28 NOTE — CONSULTS
"  [facility]    Stroke Consult Note    Reason for Consult: stroke code    Chief Complaint: Eye Problem      HPI  Stephanie Corbin is a 39 year old female with a history of anxiety who presents to the emergency department after she suddenly got bilateral blurry vision and dizziness which started at 0915 when she was parking her car. She denied any increased stress prior to onset. The patient stated that she has never experienced anything like this before and as such called her sister to bring her to the ED for evaluation. On examination done by the ED, her visual acuity was 20/50 on the right and 20/40 on the left. She had no focal numbness, weakness, or tingling. Imaging done with CT, CTA, CTP in the ED was all normal. There is an outpatient visit in the chart from last week not yet signed which states problem at hand was \"anorexia nervosa\", further details in that note unavailable.    TPA Treatment   Not given due to stroke mimic: anxiety vs vertigo, minor / isolated / quickly resolving stroke symptoms.    Endovascular Treatment  Not initiated due to absence of proximal vessel occlusion    Impression  Acute anxiety attack vs vertigo    Recommendations  - Further ED workup for possible vertigo, bloodwork etc  - Symptomatic treatment of dizziness  - No further stroke workup needed     No further stroke evaluation is recommended, so we will sign off. Please contact us with any additional questions.    Lluvia Rider PA-C  Neurology  01/28/2020 1:06 PM  To page me or covering stroke neurology team member, click here: [facility]  Choose \"On Call\" tab at top, then search dropdown box for \"Neurology Adult\" & press Enter, look for Neuro ICU/Stroke    ______________________________________________________    Past Medical History   Past Medical History:   Diagnosis Date     Anxiety      Past Surgical History   Past Surgical History:   Procedure Laterality Date     ENT SURGERY       Medications   Home " Meds  Medication Sig   ciprofloxacin (CIPRO) 500 MG tablet Take 1 tablet (500 mg) by mouth 2 times daily  Patient not taking: Reported on 1/22/2020 JUNEL 1/20 1-20 MG-MCG tablet    LAMOTRIGINE PO Take 25 mg by mouth daily Starting 4/29 to increase to 50mg daily   Prenatal Vit-Fe Fumarate-FA (PRENATAL MULTIVITAMIN  PLUS IRON) 27-0.8 MG TABS per tablet Take 1 tablet by mouth daily   Probiotic Product (PROBIOTIC DAILY PO) Take 1 tablet by mouth daily   SERTRALINE HCL PO Take 100 mg by mouth daily Weaning off after 2 more doses   Vilazodone HCl (VIIBRYD PO) Take 20 mg by mouth daily Suppose to increase to 40mg daily on 4/29/17       Scheduled Meds      Infusion Meds      PRN Meds      Allergies   Allergies   Allergen Reactions     Reglan [Metoclopramide]      Family History   No family history on file.  Social History   Social History     Tobacco Use     Smoking status: Never Smoker     Smokeless tobacco: Never Used   Substance Use Topics     Alcohol use: Not on file     Drug use: Not on file       Review of Systems   The 10 point Review of Systems is negative other than noted in the HPI or here.        PHYSICAL EXAMINATION  Temp:  [98.2  F (36.8  C)] 98.2  F (36.8  C)  Pulse:  [71-84] 71  Resp:  [20] 20  BP: (124-166)/(63-98) 124/63  SpO2:  [100 %] 100 %     General:  moderate distress, crying, shakey    HEENT:  normocephalic/atraumatic  Pulmonary:  no respiratory distress    Neurologic  Mental Status:  alert, oriented x 3, follows commands, speech clear and fluent, naming and repetition normal  Cranial Nerves:  visual fields intact, PERRL, EOMI with normal smooth pursuit, facial sensation intact and symmetric, facial movements symmetric, hearing not formally tested but intact to conversation, palate elevation symmetric and uvula midline, no dysarthria, shoulder shrug strong bilaterally, tongue protrusion midline  Motor:  normal muscle tone and bulk, no abnormal movements, able to move all limbs spontaneously,  strength 5/5 throughout upper and lower extremities, no pronator drift  Reflexes:  toes down-going  Sensory:  light touch sensation intact and symmetric throughout upper and lower extremities, no extinction on double simultaneous stimulation   Coordination:  normal finger-to-nose and heel-to-shin bilaterally without dysmetria, rapid alternating movements symmetric  Station/Gait:  deferred      Stroke Scales    NIHSS  Interval     Interval Comments     1a. Level of Consciousness 0-->Alert, keenly responsive   1b. LOC Questions 0-->Answers both questions correctly   1c. LOC Commands 0-->Performs both tasks correctly   2.   Best Gaze 0-->Normal   3.   Visual 0-->No visual loss   4.   Facial Palsy 0-->Normal symmetrical movements   5a. Motor Arm, Left 0-->No drift, limb holds 90 (or 45) degrees for full 10 secs   5b. Motor Arm, Right 0-->No drift, limb holds 90 (or 45) degrees for full 10 secs   6a. Motor Leg, Left 0-->No drift, leg holds 30 degree position for full 5 secs   6b. Motor Leg, right 0-->No drift, leg holds 30 degree position for full 5 secs   7.   Limb Ataxia 0-->Absent   8.   Sensory 0-->Normal, no sensory loss   9.   Best Language 0-->No aphasia, normal   10. Dysarthria 0-->Normal   11. Extinction and Inattention  0-->No abnormality   Total 0 (01/28/20 1306)       Imaging  I personally reviewed all imaging; relevant findings per HPI.     Lab Results Data   CBC  Recent Labs   Lab 01/28/20  1016 01/22/20  1244   WBC 5.6 4.8   RBC 3.80 3.81   HGB 12.7 12.5   HCT 37.1 38.5    298     Basic Metabolic Panel    Recent Labs   Lab 01/28/20  1016 01/22/20  1244   * 137   POTASSIUM 4.8 4.0   CHLORIDE 100 105   CO2 27 24   BUN 6* 10   CR 0.67 0.68   GLC 93 84   MOOKIE 8.8 9.0     Liver Panel  Recent Labs   Lab Test 01/22/20  1244 04/26/17  0919   PROTTOTAL 7.5 7.6   ALBUMIN 3.8 4.2   BILITOTAL 0.3 0.6   ALKPHOS 52 61   AST 68* 18   ALT 87* 27     INR  Recent Labs   Lab Test 01/28/20  1016 01/22/20  1244    INR 0.92 1.02      Lipid ProfileNo lab results found.  A1CNo lab results found.  Troponin Estuardo results for input(s): TROPI in the last 168 hours.       Stroke Code / Stroke Consult Data Data   Stroke Code Data  (for stroke code without tele)  Stroke code activated 01/28/20   1008   First stroke provider response 01/28/20   1010   Last known normal 01/28/20   0914   Time of discovery   (or onset of symptoms) 01/28/20   0915   Head CT read by me 01/28/20   1022   Was stroke code de-escalated?   01/28/20 1034  symptoms not likely caused by stroke         I have personally spent a total of 35 minutes providing critical care services supervising this patient's stroke code activation.  I personally reviewed all lab values and radiology images.  I evaluated and directed care for the patient's critical condition.

## 2020-01-28 NOTE — ED NOTES
Pt's gait was better. She wallked by herself without assistance. She is going to her sisters house however would feel safe going home and taking care of herself.

## 2020-01-28 NOTE — ED PROVIDER NOTES
History     Chief Complaint:  Blurred vision and vertigo    HPI   Stephanie Corbin is a 39 year old female who presents with sudden onset blurred vision (bilateral) and vertigo. The patient reported that she was driving this morning and at approximately 0915 just as she was parking she suddenly experienced blurred vision across her whole field of vision and vertigo. She denied any increased stress prior to onset. The patient stated that she has never experienced anything like this before and as such called her sister to bring her to the ED for evaluation. She states that her vision has gotten worse since onset and she is having intermittent whole body convulsions. On examination her visual acuity was 20/50 on the right and 20/40 on the left. She endorsed a mild headache and a history of panic attacks that present differently than her current symptoms. She stated that she only uses glasses when she is on the computer.     Allergies:  Reglan        Medications:    Lamotrigine   Sertraline      Past Medical History:    Anxiety       Past Surgical History:    ENT surgery     Family History:    No past pertinent family history.    Social History:  Negative for tobacco use.  Social alcohol use   Negative for drug use.  Marital Status:   [2]    Review of Systems   Constitutional: Negative for fever.   Eyes: Positive for visual disturbance.   Respiratory: Negative for shortness of breath.    Cardiovascular: Negative for chest pain.   Gastrointestinal: Negative for abdominal pain.   Neurological: Positive for dizziness and headaches. Negative for weakness and numbness.   All other systems reviewed and are negative.      Physical Exam     Patient Vitals for the past 24 hrs:   BP Temp Temp src Pulse Resp SpO2 Height Weight   01/28/20 1239 124/63 -- -- 71 -- 100 % -- --   01/28/20 1200 -- -- -- -- -- 100 % -- --   01/28/20 1033 (!) 150/91 -- -- 81 -- 100 % -- --   01/28/20 1014 (!) 166/98 -- -- 84 20 100 % -- --  "  01/28/20 0959 (!) 159/88 98.2  F (36.8  C) Temporal 78 20 100 % 1.737 m (5' 8.4\") 49.9 kg (110 lb)       Physical Exam  General: Alert and cooperative with exam. Patient in moderate distress. Normal mentation. Anxious appearance  Head:  Scalp is NC/AT  Eyes:  No scleral icterus, PERRL, EOMI   ENT:  The external nose and ears are normal. The oropharynx is normal and without erythema; mucus membranes are moist. Uvula midline, no evidence of deep space infection.  Neck:  Normal range of motion without rigidity.  CV:  Regular rate and rhythm    No pathologic murmur   Resp:  Breath sounds are clear bilaterally    Non-labored, no retractions or accessory muscle use  GI:  Abdomen is soft, no distension, no tenderness. No peritoneal signs  MS:  No lower extremity edema       Skin:  Warm and dry, No rash or lesions noted.  Neuro: Oriented x 3. No gross motor deficits.    Strength and sensation grossly intact in all 4 extremities.      Cranial nerves 2-12 intact.    GCS: 15     Nystagmus present (horizontal, bilateral, beating)      Emergency Department Course   ECG:  Indication: Eye Problem  Time: 1044  Vent. Rate 77 bpm. NY interval 148. QRS duration 80. QT/QTc 378/427. P-R-T axis 78 58 60.  normal sinus rhythm. Normal ECG. Read time: 1050      Imaging:  Radiographic findings were communicated with the patient who voiced understanding of the findings.    CT Head without contrast:   Negative. No acute pathology is identified. No bleed,  mass, or areas of acute infarction are visualized, as per radiology.    CT Head Neck with contrast:   1. No stenosis is seen at either carotid bifurcation.  2. No arterial dissection is identified.  3. No high-grade intracranial vascular stenosis is identified.  4. Venous sinuses appear patent  5. Small thyroid nodules. In a patient without known thyroid disease,  an incidental thyroid nodule without microcalcifications measuring  <1.0 cm in size is most likely benign and does not typically " require  follow-up, as per radiology.      Laboratory:  CBC: WBC: 5.6, HGB: 12.7, PLT: 279  BMP: NA: 132 (L), BUN: 6 (L), o/w WNL (Creatinine: 0.67)    INR: 0.92  PTT: 27     Interventions:  1025  Iopamidol 70 ml IV  1034 Zofran 4 mg IV   1047 Antivert 25 mg PO   1121 Ativan 1 mg PO     Emergency Department Course:  Nursing notes and vitals reviewed. (1000) I performed an exam of the patient as documented above.   (1007) Code stroke called.   (1011) I consulted with Stroke neurology.     IV inserted. Medicine administered as documented above. Blood drawn. This was sent to the lab for further testing, results above.    The patient was sent for head CT and head neck CT while in the emergency department, findings above.   EKG obtained in the ED, see results above.      (1111) I rechecked the patient and discussed the results of her workup thus far.   (1202) I reevaluated the patient and provided an update in regards to her ED course.    (1316) I rechecked the patient.     Findings and plan explained to the Patient. Patient discharged home with instructions regarding supportive care, medications, and reasons to return. The importance of close follow-up was reviewed.     I personally reviewed the laboratory results with the Patient and answered all related questions prior to discharge.     Impression & Plan    Medical Decision Making:  Stephanie Corbin is a 39 year old female who presents for evaluation of sudden onset blurred vision and vertigo. The differential diagnosis of vertigo is broad and includes common etiologies such as menieres disease, labyrinthitis, benign positional vertigo, otitis media, etc.  More serious etiologies considered include central etiologies such as tumor, intracerebral bleed, dissection, ischemic cerebral vascular accident.  The history, physical exam including detailed neurologic exam, and workup in the emergency room suggests a benign cause of vertigo today.  As this patient reported  sudden onset vertigo and blurred vision and code stroke was initiated on presentation.  CT/CTA imaging of the head and neck was essentially unremarkable as noted above.  Baseline labs and EKG without significant findings.  Patient was provided meclizine, Zofran, and later Ativan with noted improvement.  Patient's presentation is most consistent with peripheral vertigo and likely associated stress/anxiety reaction.  Discharged home with meclizine and Zofran for symptom relief.  Recommended close follow-up with PCP if symptoms persisting.  Referral made for outpatient vestibular rehab.  Return precautions discussed.  At the time discharge patient's vertigo was well controlled, vision had returned to normal, and she was able to ambulate without difficulty.    Diagnosis:    ICD-10-CM    1. Vertigo R42    2. Blurred vision H53.8        Disposition:  discharged to home    Scribe Disclosure:  Vira DICKEY, am serving as a scribe on 1/28/2020 at 10:00 AM to personally document services performed by Miguel Angel Waters DO based on my observations and the provider's statements to me.     Vira Owens  1/28/2020    EMERGENCY DEPARTMENT       Miguel Angel Waters DO  01/28/20 3113

## 2020-01-28 NOTE — ED TRIAGE NOTES
Sudden onset of blurred vision 30 minutes prior to ED arrival. No focal deficts. Very anxious. Feeling very shaky.

## 2020-01-28 NOTE — ED NOTES
Order placed for outpatient vestibular rehab.    Miguel Angel Waters, DO Waters, Miguel Angel Mina DO  01/28/20 1729

## 2020-01-29 LAB — VIT B1 BLD-MCNC: 73 NMOL/L (ref 70–180)

## 2020-01-30 ASSESSMENT — ENCOUNTER SYMPTOMS
LIGHT-HEADEDNESS: 0
PALPITATIONS: 0
VOMITING: 0
FATIGUE: 0
NUMBNESS: 0
DIZZINESS: 0
NAUSEA: 0
SHORTNESS OF BREATH: 0
ABDOMINAL PAIN: 0
HEADACHES: 0
WEAKNESS: 0

## 2020-01-31 LAB — VIT B1 SERPL-MCNC: NORMAL UG/DL

## 2020-02-08 ENCOUNTER — HEALTH MAINTENANCE LETTER (OUTPATIENT)
Age: 40
End: 2020-02-08

## 2020-02-11 ENCOUNTER — HOSPITAL ENCOUNTER (OUTPATIENT)
Dept: CARDIOLOGY | Facility: CLINIC | Age: 40
Discharge: HOME | End: 2020-02-11
Attending: INTERNAL MEDICINE | Admitting: INTERNAL MEDICINE
Payer: COMMERCIAL

## 2020-02-11 DIAGNOSIS — F50.019 ANOREXIA NERVOSA, RESTRICTING TYPE: ICD-10-CM

## 2020-02-11 PROCEDURE — 93306 TTE W/DOPPLER COMPLETE: CPT

## 2020-02-11 PROCEDURE — 93306 TTE W/DOPPLER COMPLETE: CPT | Mod: 26 | Performed by: INTERNAL MEDICINE

## 2020-02-18 DIAGNOSIS — R74.8 ELEVATED LIVER ENZYMES: Primary | ICD-10-CM

## 2020-10-30 ENCOUNTER — HOSPITAL ENCOUNTER (OUTPATIENT)
Dept: MAMMOGRAPHY | Facility: CLINIC | Age: 40
Discharge: HOME | End: 2020-10-30
Attending: SPECIALIST | Admitting: SPECIALIST
Payer: COMMERCIAL

## 2020-10-30 DIAGNOSIS — Z12.31 VISIT FOR SCREENING MAMMOGRAM: ICD-10-CM

## 2020-10-30 PROCEDURE — 77067 SCR MAMMO BI INCL CAD: CPT

## 2020-11-07 ENCOUNTER — HEALTH MAINTENANCE LETTER (OUTPATIENT)
Age: 40
End: 2020-11-07

## 2021-03-27 ENCOUNTER — HEALTH MAINTENANCE LETTER (OUTPATIENT)
Age: 41
End: 2021-03-27

## 2021-09-05 ENCOUNTER — HEALTH MAINTENANCE LETTER (OUTPATIENT)
Age: 41
End: 2021-09-05

## 2022-04-17 ENCOUNTER — HEALTH MAINTENANCE LETTER (OUTPATIENT)
Age: 42
End: 2022-04-17

## 2022-10-23 ENCOUNTER — HEALTH MAINTENANCE LETTER (OUTPATIENT)
Age: 42
End: 2022-10-23

## 2023-03-08 ENCOUNTER — HOSPITAL ENCOUNTER (OUTPATIENT)
Dept: MAMMOGRAPHY | Facility: CLINIC | Age: 43
Discharge: HOME | End: 2023-03-08
Admitting: SPECIALIST
Payer: COMMERCIAL

## 2023-03-08 DIAGNOSIS — Z12.31 VISIT FOR SCREENING MAMMOGRAM: ICD-10-CM

## 2023-03-08 PROCEDURE — 77067 SCR MAMMO BI INCL CAD: CPT

## 2023-06-01 ENCOUNTER — HEALTH MAINTENANCE LETTER (OUTPATIENT)
Age: 43
End: 2023-06-01

## 2024-06-08 ENCOUNTER — HEALTH MAINTENANCE LETTER (OUTPATIENT)
Age: 44
End: 2024-06-08

## 2025-05-25 ENCOUNTER — HEALTH MAINTENANCE LETTER (OUTPATIENT)
Age: 45
End: 2025-05-25

## 2025-06-15 ENCOUNTER — HEALTH MAINTENANCE LETTER (OUTPATIENT)
Age: 45
End: 2025-06-15

## 2025-07-01 NOTE — PROGRESS NOTES
"[facility]  Gastroenterology Progress note      Assessment       Doing well.  WBC normal, less tender and tolerating clear liquids.  Appreciate surgery input  Pathology on polyp:  Serrated adenoma      Plan    Observe one more day        Interval History     no change      Physical Exam    /47 (BP Location: Left arm)  Pulse 70  Temp 98.1  F (36.7  C) (Oral)  Resp 16  Ht 1.702 m (5' 7.01\")  Wt 46.2 kg (101 lb 12.8 oz)  SpO2 100%  BMI 15.94 kg/m2  Temp (24hrs), Av.5  F (36.9  C), Min:97.3  F (36.3  C), Max:100.1  F (37.8  C)    Patient Vitals for the past 72 hrs:   Weight   17 1338 46.2 kg (101 lb 12.8 oz)   17 0907 47.6 kg (105 lb)       Intake/Output Summary (Last 24 hours) at 17 1548  Last data filed at 17 1200   Gross per 24 hour   Intake                0 ml   Output              300 ml   Net             -300 ml         Constitutional: NAD, comfortable  Cardiovascular: RRR, normal S1, S2   Respiratory: CTAB  Abdomen: soft, non-tender, nondistended,  Minimal tenderness RLQ and suprapubic region      Additional Comments     ROS, FH, SH: See initial GI consult for details.    Lab Data     Recent Labs   Lab Test  17   0545  17   0919   WBC  5.7  13.3*   HGB  11.3*  14.0   MCV  98  97   PLT  157  200     Recent Labs   Lab Test  17   0545  17   0919   NA  143  137   POTASSIUM  4.0  4.6   CHLORIDE  112*  102   CO2  24  30   BUN  7  7   CR  0.65  0.71   ANIONGAP  7  5   MOOKIE  8.1*  8.9     Recent Labs   Lab Test  17   1220  17   2110  17   0919   ALBUMIN   --    --   4.2   BILITOTAL   --    --   0.6   ALT   --    --   27   AST   --    --   18   ALKPHOS   --    --   61   PROTEIN  Negative  Negative   --    LIPASE   --    --   127               S. Manfred Parikh MD  [facility]  Office: [phone] call if needed after 5PM or on weekends  Cell: [phone], not available after 5PM at this number  " Patient is coming for labs 7-23-25 per Dr Zee. She notes patient can get reclast pending those labs.